# Patient Record
Sex: MALE | Race: WHITE | HISPANIC OR LATINO | Employment: FULL TIME | URBAN - METROPOLITAN AREA
[De-identification: names, ages, dates, MRNs, and addresses within clinical notes are randomized per-mention and may not be internally consistent; named-entity substitution may affect disease eponyms.]

---

## 2018-12-06 ENCOUNTER — HOSPITAL ENCOUNTER (INPATIENT)
Facility: HOSPITAL | Age: 43
LOS: 2 days | Discharge: HOME | DRG: 638 | End: 2018-12-08
Attending: EMERGENCY MEDICINE | Admitting: STUDENT IN AN ORGANIZED HEALTH CARE EDUCATION/TRAINING PROGRAM

## 2018-12-06 DIAGNOSIS — N17.9 AKI (ACUTE KIDNEY INJURY): ICD-10-CM

## 2018-12-06 DIAGNOSIS — E13.10 DIABETIC KETOACIDOSIS WITHOUT COMA ASSOCIATED WITH OTHER SPECIFIED DIABETES MELLITUS: Primary | ICD-10-CM

## 2018-12-06 PROBLEM — F10.10 ALCOHOL ABUSE: Status: RESOLVED | Noted: 2018-12-06 | Resolved: 2018-12-06

## 2018-12-06 PROBLEM — E87.29 HIGH ANION GAP METABOLIC ACIDOSIS: Status: ACTIVE | Noted: 2018-12-06

## 2018-12-06 PROBLEM — E11.10 DIABETIC ACIDOSIS WITHOUT COMA: Status: ACTIVE | Noted: 2018-12-06

## 2018-12-06 PROBLEM — F10.10 ALCOHOL ABUSE: Status: ACTIVE | Noted: 2018-12-06

## 2018-12-06 LAB
ALBUMIN SERPL BCP-MCNC: 3.4 G/DL
ALBUMIN SERPL BCP-MCNC: 4 G/DL
ALLENS TEST: ABNORMAL
ALP SERPL-CCNC: 126 U/L
ALP SERPL-CCNC: 176 U/L
ALT SERPL W/O P-5'-P-CCNC: 44 U/L
ALT SERPL W/O P-5'-P-CCNC: 53 U/L
AMPHET+METHAMPHET UR QL: NEGATIVE
AMYLASE SERPL-CCNC: 292 U/L
ANION GAP SERPL CALC-SCNC: 18 MMOL/L
ANION GAP SERPL CALC-SCNC: 18 MMOL/L
ANION GAP SERPL CALC-SCNC: 28 MMOL/L
ANION GAP SERPL CALC-SCNC: 29 MMOL/L
ANISOCYTOSIS BLD QL SMEAR: SLIGHT
AST SERPL-CCNC: 55 U/L
AST SERPL-CCNC: 76 U/L
B-OH-BUTYR BLD STRIP-SCNC: 6 MMOL/L
BACTERIA #/AREA URNS AUTO: NORMAL /HPF
BARBITURATES UR QL SCN>200 NG/ML: NEGATIVE
BASOPHILS # BLD AUTO: 0.05 K/UL
BASOPHILS NFR BLD: 0.7 %
BENZODIAZ UR QL SCN>200 NG/ML: NEGATIVE
BILIRUB DIRECT SERPL-MCNC: 0.4 MG/DL
BILIRUB SERPL-MCNC: 1 MG/DL
BILIRUB SERPL-MCNC: 1.4 MG/DL
BILIRUB UR QL STRIP: NEGATIVE
BNP SERPL-MCNC: <10 PG/ML
BUN SERPL-MCNC: 18 MG/DL
BUN SERPL-MCNC: 18 MG/DL
BUN SERPL-MCNC: 24 MG/DL
BUN SERPL-MCNC: 25 MG/DL
BZE UR QL SCN: NEGATIVE
CALCIUM SERPL-MCNC: 10.3 MG/DL
CALCIUM SERPL-MCNC: 11 MG/DL
CALCIUM SERPL-MCNC: 9.8 MG/DL
CALCIUM SERPL-MCNC: 9.8 MG/DL
CANNABINOIDS UR QL SCN: NEGATIVE
CHLORIDE SERPL-SCNC: 103 MMOL/L
CHLORIDE SERPL-SCNC: 103 MMOL/L
CHLORIDE SERPL-SCNC: 91 MMOL/L
CHLORIDE SERPL-SCNC: 94 MMOL/L
CLARITY UR REFRACT.AUTO: CLEAR
CO2 SERPL-SCNC: 11 MMOL/L
CO2 SERPL-SCNC: 16 MMOL/L
CO2 SERPL-SCNC: 16 MMOL/L
CO2 SERPL-SCNC: 9 MMOL/L
COLOR UR AUTO: ABNORMAL
CREAT SERPL-MCNC: 1.5 MG/DL
CREAT SERPL-MCNC: 1.5 MG/DL
CREAT SERPL-MCNC: 1.9 MG/DL
CREAT SERPL-MCNC: 2.2 MG/DL
CREAT UR-MCNC: 27 MG/DL
D DIMER PPP IA.FEU-MCNC: 0.45 MG/L FEU
DIFFERENTIAL METHOD: ABNORMAL
EOSINOPHIL # BLD AUTO: 0 K/UL
EOSINOPHIL NFR BLD: 0.3 %
ERYTHROCYTE [DISTWIDTH] IN BLOOD BY AUTOMATED COUNT: 12.2 %
EST. GFR  (AFRICAN AMERICAN): 40.6 ML/MIN/1.73 M^2
EST. GFR  (AFRICAN AMERICAN): 48.5 ML/MIN/1.73 M^2
EST. GFR  (AFRICAN AMERICAN): >60 ML/MIN/1.73 M^2
EST. GFR  (AFRICAN AMERICAN): >60 ML/MIN/1.73 M^2
EST. GFR  (NON AFRICAN AMERICAN): 35.1 ML/MIN/1.73 M^2
EST. GFR  (NON AFRICAN AMERICAN): 41.9 ML/MIN/1.73 M^2
EST. GFR  (NON AFRICAN AMERICAN): 55.8 ML/MIN/1.73 M^2
EST. GFR  (NON AFRICAN AMERICAN): 55.8 ML/MIN/1.73 M^2
ESTIMATED AVG GLUCOSE: 349 MG/DL
ETHANOL SERPL-MCNC: <10 MG/DL
GLUCOSE SERPL-MCNC: 225 MG/DL
GLUCOSE SERPL-MCNC: 225 MG/DL
GLUCOSE SERPL-MCNC: 666 MG/DL
GLUCOSE SERPL-MCNC: 725 MG/DL
GLUCOSE UR QL STRIP: ABNORMAL
HBA1C MFR BLD HPLC: 13.8 %
HCO3 UR-SCNC: 15.9 MMOL/L (ref 24–28)
HCT VFR BLD AUTO: 40.9 %
HGB BLD-MCNC: 13.8 G/DL
HGB UR QL STRIP: ABNORMAL
IMM GRANULOCYTES # BLD AUTO: 0.03 K/UL
IMM GRANULOCYTES NFR BLD AUTO: 0.4 %
KETONES UR QL STRIP: ABNORMAL
LACTATE SERPL-SCNC: 1.1 MMOL/L
LEUKOCYTE ESTERASE UR QL STRIP: NEGATIVE
LIPASE SERPL-CCNC: 37 U/L
LYMPHOCYTES # BLD AUTO: 1 K/UL
LYMPHOCYTES NFR BLD: 13.1 %
MAGNESIUM SERPL-MCNC: 2.1 MG/DL
MAGNESIUM SERPL-MCNC: 2.1 MG/DL
MCH RBC QN AUTO: 29 PG
MCHC RBC AUTO-ENTMCNC: 33.7 G/DL
MCV RBC AUTO: 86 FL
METHADONE UR QL SCN>300 NG/ML: NEGATIVE
MICROSCOPIC COMMENT: NORMAL
MONOCYTES # BLD AUTO: 0.4 K/UL
MONOCYTES NFR BLD: 5.9 %
NEUTROPHILS # BLD AUTO: 6 K/UL
NEUTROPHILS NFR BLD: 79.6 %
NITRITE UR QL STRIP: NEGATIVE
NRBC BLD-RTO: 0 /100 WBC
OPIATES UR QL SCN: NEGATIVE
PCO2 BLDA: 38.7 MMHG (ref 35–45)
PCP UR QL SCN>25 NG/ML: NEGATIVE
PH SMN: 7.22 [PH] (ref 7.35–7.45)
PH UR STRIP: 5 [PH] (ref 5–8)
PLATELET # BLD AUTO: 220 K/UL
PLATELET BLD QL SMEAR: ABNORMAL
PMV BLD AUTO: 12.8 FL
PO2 BLDA: 21 MMHG (ref 40–60)
POC BE: -12 MMOL/L
POC SATURATED O2: 27 % (ref 95–100)
POC TCO2: 17 MMOL/L (ref 24–29)
POCT GLUCOSE: 182 MG/DL (ref 70–110)
POCT GLUCOSE: 191 MG/DL (ref 70–110)
POCT GLUCOSE: 214 MG/DL (ref 70–110)
POCT GLUCOSE: 279 MG/DL (ref 70–110)
POCT GLUCOSE: 313 MG/DL (ref 70–110)
POCT GLUCOSE: 382 MG/DL (ref 70–110)
POCT GLUCOSE: 422 MG/DL (ref 70–110)
POCT GLUCOSE: >500 MG/DL (ref 70–110)
POLYCHROMASIA BLD QL SMEAR: ABNORMAL
POTASSIUM SERPL-SCNC: 3.6 MMOL/L
POTASSIUM SERPL-SCNC: 3.6 MMOL/L
POTASSIUM SERPL-SCNC: 5 MMOL/L
POTASSIUM SERPL-SCNC: 5.9 MMOL/L
PROT SERPL-MCNC: 6.9 G/DL
PROT SERPL-MCNC: 8.6 G/DL
PROT UR QL STRIP: NEGATIVE
RBC # BLD AUTO: 4.76 M/UL
RBC #/AREA URNS AUTO: 0 /HPF (ref 0–4)
SAMPLE: ABNORMAL
SITE: ABNORMAL
SODIUM SERPL-SCNC: 129 MMOL/L
SODIUM SERPL-SCNC: 133 MMOL/L
SODIUM SERPL-SCNC: 137 MMOL/L
SODIUM SERPL-SCNC: 137 MMOL/L
SP GR UR STRIP: 1.02 (ref 1–1.03)
TOXICOLOGY INFORMATION: NORMAL
TROPONIN I SERPL DL<=0.01 NG/ML-MCNC: 0.03 NG/ML
URN SPEC COLLECT METH UR: ABNORMAL
WBC # BLD AUTO: 7.48 K/UL
WBC #/AREA URNS AUTO: 1 /HPF (ref 0–5)
YEAST UR QL AUTO: NORMAL

## 2018-12-06 PROCEDURE — 80320 DRUG SCREEN QUANTALCOHOLS: CPT

## 2018-12-06 PROCEDURE — 84484 ASSAY OF TROPONIN QUANT: CPT

## 2018-12-06 PROCEDURE — 83036 HEMOGLOBIN GLYCOSYLATED A1C: CPT

## 2018-12-06 PROCEDURE — 80076 HEPATIC FUNCTION PANEL: CPT

## 2018-12-06 PROCEDURE — 85025 COMPLETE CBC W/AUTO DIFF WBC: CPT

## 2018-12-06 PROCEDURE — 80048 BASIC METABOLIC PNL TOTAL CA: CPT

## 2018-12-06 PROCEDURE — 96372 THER/PROPH/DIAG INJ SC/IM: CPT

## 2018-12-06 PROCEDURE — 93010 ELECTROCARDIOGRAM REPORT: CPT | Mod: ,,, | Performed by: INTERNAL MEDICINE

## 2018-12-06 PROCEDURE — 83880 ASSAY OF NATRIURETIC PEPTIDE: CPT

## 2018-12-06 PROCEDURE — 83735 ASSAY OF MAGNESIUM: CPT

## 2018-12-06 PROCEDURE — 99291 CRITICAL CARE FIRST HOUR: CPT | Mod: ,,, | Performed by: EMERGENCY MEDICINE

## 2018-12-06 PROCEDURE — 83690 ASSAY OF LIPASE: CPT

## 2018-12-06 PROCEDURE — 99285 EMERGENCY DEPT VISIT HI MDM: CPT | Mod: 25

## 2018-12-06 PROCEDURE — 20600001 HC STEP DOWN PRIVATE ROOM

## 2018-12-06 PROCEDURE — 93005 ELECTROCARDIOGRAM TRACING: CPT

## 2018-12-06 PROCEDURE — 96374 THER/PROPH/DIAG INJ IV PUSH: CPT | Mod: 59

## 2018-12-06 PROCEDURE — 82803 BLOOD GASES ANY COMBINATION: CPT

## 2018-12-06 PROCEDURE — 83605 ASSAY OF LACTIC ACID: CPT

## 2018-12-06 PROCEDURE — 82150 ASSAY OF AMYLASE: CPT

## 2018-12-06 PROCEDURE — 96361 HYDRATE IV INFUSION ADD-ON: CPT

## 2018-12-06 PROCEDURE — 99223 1ST HOSP IP/OBS HIGH 75: CPT | Mod: ,,, | Performed by: STUDENT IN AN ORGANIZED HEALTH CARE EDUCATION/TRAINING PROGRAM

## 2018-12-06 PROCEDURE — 80053 COMPREHEN METABOLIC PANEL: CPT

## 2018-12-06 PROCEDURE — 85379 FIBRIN DEGRADATION QUANT: CPT

## 2018-12-06 PROCEDURE — 82010 KETONE BODYS QUAN: CPT

## 2018-12-06 PROCEDURE — 25000003 PHARM REV CODE 250: Performed by: NURSE PRACTITIONER

## 2018-12-06 PROCEDURE — 80048 BASIC METABOLIC PNL TOTAL CA: CPT | Mod: 91

## 2018-12-06 PROCEDURE — 81001 URINALYSIS AUTO W/SCOPE: CPT

## 2018-12-06 PROCEDURE — 63600175 PHARM REV CODE 636 W HCPCS: Performed by: NURSE PRACTITIONER

## 2018-12-06 PROCEDURE — 63600175 PHARM REV CODE 636 W HCPCS: Performed by: STUDENT IN AN ORGANIZED HEALTH CARE EDUCATION/TRAINING PROGRAM

## 2018-12-06 PROCEDURE — 80307 DRUG TEST PRSMV CHEM ANLYZR: CPT

## 2018-12-06 RX ORDER — DEXTROSE MONOHYDRATE 100 MG/ML
1000 INJECTION, SOLUTION INTRAVENOUS
Status: DISCONTINUED | OUTPATIENT
Start: 2018-12-06 | End: 2018-12-07

## 2018-12-06 RX ORDER — SODIUM CHLORIDE 0.9 % (FLUSH) 0.9 %
5 SYRINGE (ML) INJECTION
Status: DISCONTINUED | OUTPATIENT
Start: 2018-12-06 | End: 2018-12-08 | Stop reason: HOSPADM

## 2018-12-06 RX ORDER — ENOXAPARIN SODIUM 100 MG/ML
40 INJECTION SUBCUTANEOUS EVERY 24 HOURS
Status: DISCONTINUED | OUTPATIENT
Start: 2018-12-06 | End: 2018-12-08 | Stop reason: HOSPADM

## 2018-12-06 RX ORDER — SODIUM CHLORIDE AND POTASSIUM CHLORIDE 150; 900 MG/100ML; MG/100ML
INJECTION, SOLUTION INTRAVENOUS CONTINUOUS
Status: DISCONTINUED | OUTPATIENT
Start: 2018-12-06 | End: 2018-12-07

## 2018-12-06 RX ORDER — ASPIRIN 325 MG
325 TABLET ORAL
Status: COMPLETED | OUTPATIENT
Start: 2018-12-06 | End: 2018-12-06

## 2018-12-06 RX ADMIN — SODIUM CHLORIDE 10.5 UNITS/HR: 9 INJECTION, SOLUTION INTRAVENOUS at 02:12

## 2018-12-06 RX ADMIN — SODIUM CHLORIDE 1000 ML: 0.9 INJECTION, SOLUTION INTRAVENOUS at 12:12

## 2018-12-06 RX ADMIN — SODIUM CHLORIDE 3.1 UNITS/HR: 9 INJECTION, SOLUTION INTRAVENOUS at 08:12

## 2018-12-06 RX ADMIN — SODIUM CHLORIDE 1000 ML: 0.9 INJECTION, SOLUTION INTRAVENOUS at 01:12

## 2018-12-06 RX ADMIN — ENOXAPARIN SODIUM 40 MG: 100 INJECTION SUBCUTANEOUS at 06:12

## 2018-12-06 RX ADMIN — ASPIRIN 325 MG ORAL TABLET 325 MG: 325 PILL ORAL at 12:12

## 2018-12-06 RX ADMIN — SODIUM CHLORIDE 3.6 UNITS/HR: 9 INJECTION, SOLUTION INTRAVENOUS at 09:12

## 2018-12-06 RX ADMIN — SODIUM CHLORIDE AND POTASSIUM CHLORIDE: 9; 1.49 INJECTION, SOLUTION INTRAVENOUS at 06:12
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
## 2018-12-06 NOTE — HOSPITAL COURSE
ED course: Noted to be tachycardic on arrival. Labs notable for hyponatremia (129), hyperkalemia (5.9), bicarbonate of 9, with anion gap of 29, and glucose of 725. Beta-hydroxy was 6. EKG was sinus tachycardia. He received 2L IVF, and was started on insulin drip. Admitted to medicine for DKA. CIWA score 4.   12/07: No acute events overnight. Transitioned to IVF with K. Insulin drip titrated down as glucose corrected and switched to subcutaneous insulin bridge. Labs this AM notable for resolution of hyponatremia (138), glucose down to 139, anion gap closed, and bicarbonate normalized at 23. CIWA score 1. Symptoms improved; nausea, vomiting resolved. Will likely watch today as patient insulin naive. Endocrinology consulted to manage outpatient regiment; will discharge on 70/30 combination. Ophthalmology consulted for worsening bilateral blurry vision; no diabetic retinopathy; diagnosed with refractive error; will follow-up with optho here outpatient. Has outpatient [facility]-up [facility] on [facility] for diabetes management.   12/08: No events overnight. On subcutaneous insulin with low dose sliding. Tolerating diabetic diet. Urinating normally. Ambulating without palpitations, lightheadedness. Glucose 283 this AM. Per endocrinology, will be discharged on 70/30 with 25 U in AM and 35 U in PM, since patient eats larger meal for dinner and little breakfast. Diabetes education complete. Written prescriptions for insulin and supplies verified by endocrinology.

## 2018-12-06 NOTE — ED NOTES
Patient identifiers verified and correct for Mr Hair  C/C:Heart palpitations, dehydrated  APPEARANCE: awake and alert in NAD.  SKIN: warm, dry and intact. No breakdown or bruising.  MUSCULOSKELETAL: Patient moving all extremities spontaneously, no obvious swelling or deformities noted. Ambulates independently.  RESPIRATORY: Denies shortness of breath.Respirations unlabored.   CARDIAC: Denies CP, 2+ distal pulses; no peripheral edema  ABDOMEN: Abdomen large, mid upper abd pain , positive emesis, took laxative yesterday with min results.   : voids spontaneously, denies difficulty  Neurologic: AAO x 4; follows commands equal strength in all extremities; denies numbness/tingling.Positive  dizziness  Positive weakness states when he takes his glasses off, the room spins.
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
## 2018-12-06 NOTE — PROVIDER PROGRESS NOTES - EMERGENCY DEPT.
Encounter Date: 12/6/2018    ED Physician Progress Notes       SCRIBE NOTE: I, Eddi Mackey, am scribing for, and in the presence of,  Dr. Grijalva.  Physician Statement: I, Dr. Grijalva, personally performed the services described in this documentation as scribed by Eddi Mackey in my presence, and it is both accurate and complete.      EKG - STEMI Decision  Initial Reading: No STEMI present.

## 2018-12-06 NOTE — ED PROVIDER NOTES
Encounter Date: 12/6/2018    SCRIBE #1 NOTE: I, Eddi Mackey, am scribing for, and in the presence of,  Dr. Grijalva. I have scribed the following portions of the note - the EKG reading.       History     Chief Complaint   Patient presents with    Irregular Heart Beat     Pt reports irregular heartbeat.  States he is also feeling weak, vomiting intermittently x 1 month.      HPI  Review of patient's allergies indicates:  Allergies not on file  No past medical history on file.  No past surgical history on file.  No family history on file.  Social History     Tobacco Use    Smoking status: Not on file   Substance Use Topics    Alcohol use: Not on file    Drug use: Not on file     Review of Systems    Physical Exam     Initial Vitals [12/06/18 1129]   BP Pulse Resp Temp SpO2   (!) 163/106 (!) 112 16 97.4 °F (36.3 °C) 99 %      MAP       --         Physical Exam    ED Course   Procedures  Labs Reviewed   CBC W/ AUTO DIFFERENTIAL   COMPREHENSIVE METABOLIC PANEL   TROPONIN I   B-TYPE NATRIURETIC PEPTIDE     EKG Readings: (Independently Interpreted)   Initial Reading: No STEMI.       Imaging Results    None                     Scribe Attestation:   Scribe #1: I performed the above scribed service and the documentation accurately describes the services I performed. I attest to the accuracy of the note.               Clinical Impression:

## 2018-12-06 NOTE — ASSESSMENT & PLAN NOTE
· BUN and Cr elevated on admission to 25 and 2.5, respectively.   · Likely pre-renal secondary to hypovolemia in setting of DKA  · Fluid resuscitation ordered per DKA algorithm.   · BMP ordered Q4H.
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
## 2018-12-06 NOTE — ASSESSMENT & PLAN NOTE
· Reported history of drinking up 10 beers/day since age 13.   · Last reported drink 11/26.   · Cardiac monitoring ordered.

## 2018-12-07 PROBLEM — E11.9 TYPE 2 DIABETES MELLITUS: Status: ACTIVE | Noted: 2018-12-07

## 2018-12-07 PROBLEM — H53.8 BLURRY VISION, LEFT EYE: Status: ACTIVE | Noted: 2018-12-07

## 2018-12-07 PROBLEM — H52.7 REFRACTIVE ERROR: Status: ACTIVE | Noted: 2018-12-07

## 2018-12-07 LAB
ANION GAP SERPL CALC-SCNC: 11 MMOL/L
ANION GAP SERPL CALC-SCNC: 12 MMOL/L
ANION GAP SERPL CALC-SCNC: 12 MMOL/L
ANION GAP SERPL CALC-SCNC: 9 MMOL/L
BASOPHILS # BLD AUTO: 0.03 K/UL
BASOPHILS NFR BLD: 0.5 %
BUN SERPL-MCNC: 11 MG/DL
BUN SERPL-MCNC: 12 MG/DL
BUN SERPL-MCNC: 13 MG/DL
BUN SERPL-MCNC: 16 MG/DL
CALCIUM SERPL-MCNC: 8.6 MG/DL
CALCIUM SERPL-MCNC: 8.9 MG/DL
CALCIUM SERPL-MCNC: 9.1 MG/DL
CALCIUM SERPL-MCNC: 9.8 MG/DL
CHLORIDE SERPL-SCNC: 105 MMOL/L
CHLORIDE SERPL-SCNC: 106 MMOL/L
CHLORIDE SERPL-SCNC: 108 MMOL/L
CHLORIDE SERPL-SCNC: 112 MMOL/L
CO2 SERPL-SCNC: 17 MMOL/L
CO2 SERPL-SCNC: 19 MMOL/L
CO2 SERPL-SCNC: 21 MMOL/L
CO2 SERPL-SCNC: 23 MMOL/L
CREAT SERPL-MCNC: 1.1 MG/DL
CREAT SERPL-MCNC: 1.1 MG/DL
CREAT SERPL-MCNC: 1.2 MG/DL
CREAT SERPL-MCNC: 1.4 MG/DL
DIFFERENTIAL METHOD: ABNORMAL
EOSINOPHIL # BLD AUTO: 0.2 K/UL
EOSINOPHIL NFR BLD: 3.6 %
ERYTHROCYTE [DISTWIDTH] IN BLOOD BY AUTOMATED COUNT: 12.5 %
EST. GFR  (AFRICAN AMERICAN): >60 ML/MIN/1.73 M^2
EST. GFR  (NON AFRICAN AMERICAN): >60 ML/MIN/1.73 M^2
GLUCOSE SERPL-MCNC: 139 MG/DL
GLUCOSE SERPL-MCNC: 151 MG/DL
GLUCOSE SERPL-MCNC: 158 MG/DL
GLUCOSE SERPL-MCNC: 243 MG/DL
HCT VFR BLD AUTO: 32.7 %
HGB BLD-MCNC: 11.1 G/DL
IMM GRANULOCYTES # BLD AUTO: 0.04 K/UL
IMM GRANULOCYTES NFR BLD AUTO: 0.7 %
LYMPHOCYTES # BLD AUTO: 1.7 K/UL
LYMPHOCYTES NFR BLD: 27.6 %
MAGNESIUM SERPL-MCNC: 1.9 MG/DL
MAGNESIUM SERPL-MCNC: 2 MG/DL
MAGNESIUM SERPL-MCNC: 2.1 MG/DL
MAGNESIUM SERPL-MCNC: 2.2 MG/DL
MCH RBC QN AUTO: 29.6 PG
MCHC RBC AUTO-ENTMCNC: 33.9 G/DL
MCV RBC AUTO: 87 FL
MONOCYTES # BLD AUTO: 0.5 K/UL
MONOCYTES NFR BLD: 8.6 %
NEUTROPHILS # BLD AUTO: 3.6 K/UL
NEUTROPHILS NFR BLD: 59 %
NRBC BLD-RTO: 0 /100 WBC
PLATELET # BLD AUTO: 183 K/UL
PMV BLD AUTO: 11.4 FL
POCT GLUCOSE: 125 MG/DL (ref 70–110)
POCT GLUCOSE: 137 MG/DL (ref 70–110)
POCT GLUCOSE: 140 MG/DL (ref 70–110)
POCT GLUCOSE: 143 MG/DL (ref 70–110)
POCT GLUCOSE: 143 MG/DL (ref 70–110)
POCT GLUCOSE: 146 MG/DL (ref 70–110)
POCT GLUCOSE: 159 MG/DL (ref 70–110)
POCT GLUCOSE: 170 MG/DL (ref 70–110)
POCT GLUCOSE: 178 MG/DL (ref 70–110)
POCT GLUCOSE: 211 MG/DL (ref 70–110)
POCT GLUCOSE: 246 MG/DL (ref 70–110)
POCT GLUCOSE: 250 MG/DL (ref 70–110)
POCT GLUCOSE: 338 MG/DL (ref 70–110)
POCT GLUCOSE: >500 MG/DL (ref 70–110)
POTASSIUM SERPL-SCNC: 3.5 MMOL/L
POTASSIUM SERPL-SCNC: 3.9 MMOL/L
POTASSIUM SERPL-SCNC: 4.2 MMOL/L
POTASSIUM SERPL-SCNC: 4.4 MMOL/L
RBC # BLD AUTO: 3.75 M/UL
SODIUM SERPL-SCNC: 136 MMOL/L
SODIUM SERPL-SCNC: 138 MMOL/L
SODIUM SERPL-SCNC: 140 MMOL/L
SODIUM SERPL-SCNC: 141 MMOL/L
WBC # BLD AUTO: 6.05 K/UL

## 2018-12-07 PROCEDURE — 80048 BASIC METABOLIC PNL TOTAL CA: CPT

## 2018-12-07 PROCEDURE — 36415 COLL VENOUS BLD VENIPUNCTURE: CPT

## 2018-12-07 PROCEDURE — 80048 BASIC METABOLIC PNL TOTAL CA: CPT | Mod: 91

## 2018-12-07 PROCEDURE — 85025 COMPLETE CBC W/AUTO DIFF WBC: CPT

## 2018-12-07 PROCEDURE — G8980 MOBILITY D/C STATUS: HCPCS | Mod: CH

## 2018-12-07 PROCEDURE — 97161 PT EVAL LOW COMPLEX 20 MIN: CPT

## 2018-12-07 PROCEDURE — 63600175 PHARM REV CODE 636 W HCPCS: Performed by: HOSPITALIST

## 2018-12-07 PROCEDURE — 99222 1ST HOSP IP/OBS MODERATE 55: CPT | Mod: ,,, | Performed by: INTERNAL MEDICINE

## 2018-12-07 PROCEDURE — 63600175 PHARM REV CODE 636 W HCPCS: Performed by: STUDENT IN AN ORGANIZED HEALTH CARE EDUCATION/TRAINING PROGRAM

## 2018-12-07 PROCEDURE — 99232 SBSQ HOSP IP/OBS MODERATE 35: CPT | Mod: ,,, | Performed by: STUDENT IN AN ORGANIZED HEALTH CARE EDUCATION/TRAINING PROGRAM

## 2018-12-07 PROCEDURE — G8978 MOBILITY CURRENT STATUS: HCPCS | Mod: CH

## 2018-12-07 PROCEDURE — G8979 MOBILITY GOAL STATUS: HCPCS | Mod: CH

## 2018-12-07 PROCEDURE — 25000003 PHARM REV CODE 250: Performed by: STUDENT IN AN ORGANIZED HEALTH CARE EDUCATION/TRAINING PROGRAM

## 2018-12-07 PROCEDURE — 97165 OT EVAL LOW COMPLEX 30 MIN: CPT

## 2018-12-07 PROCEDURE — S5571 INSULIN DISPOS PEN 3 ML: HCPCS | Performed by: STUDENT IN AN ORGANIZED HEALTH CARE EDUCATION/TRAINING PROGRAM

## 2018-12-07 PROCEDURE — 20600001 HC STEP DOWN PRIVATE ROOM

## 2018-12-07 PROCEDURE — 83735 ASSAY OF MAGNESIUM: CPT | Mod: 91

## 2018-12-07 PROCEDURE — 83735 ASSAY OF MAGNESIUM: CPT

## 2018-12-07 RX ORDER — INSULIN ASPART 100 [IU]/ML
0-5 INJECTION, SOLUTION INTRAVENOUS; SUBCUTANEOUS
Status: DISCONTINUED | OUTPATIENT
Start: 2018-12-07 | End: 2018-12-08 | Stop reason: HOSPADM

## 2018-12-07 RX ORDER — DEXTROSE MONOHYDRATE, SODIUM CHLORIDE, AND POTASSIUM CHLORIDE 50; 1.49; 4.5 G/1000ML; G/1000ML; G/1000ML
INJECTION, SOLUTION INTRAVENOUS CONTINUOUS
Status: DISCONTINUED | OUTPATIENT
Start: 2018-12-07 | End: 2018-12-07

## 2018-12-07 RX ORDER — CALCIUM CARB/VITAMIN D3/VIT K1 500-100-40
1 TABLET,CHEWABLE ORAL 2 TIMES DAILY WITH MEALS
Qty: 100 EACH | Refills: 11 | COMMUNITY
Start: 2018-12-07 | End: 2018-12-08 | Stop reason: SDUPTHER

## 2018-12-07 RX ORDER — GLUCAGON 1 MG
1 KIT INJECTION
Status: DISCONTINUED | OUTPATIENT
Start: 2018-12-07 | End: 2018-12-08 | Stop reason: HOSPADM

## 2018-12-07 RX ORDER — BLOOD-GLUCOSE CONTROL, NORMAL
1 EACH MISCELLANEOUS 2 TIMES DAILY WITH MEALS
Qty: 100 EACH | Refills: 11 | Status: SHIPPED | OUTPATIENT
Start: 2018-12-07 | End: 2018-12-08 | Stop reason: SDUPTHER

## 2018-12-07 RX ORDER — INSULIN ASPART 100 [IU]/ML
8 INJECTION, SOLUTION INTRAVENOUS; SUBCUTANEOUS
Status: DISCONTINUED | OUTPATIENT
Start: 2018-12-07 | End: 2018-12-08

## 2018-12-07 RX ORDER — DEXTROSE 4 G
TABLET,CHEWABLE ORAL
Qty: 1 EACH | Refills: 0 | Status: SHIPPED | OUTPATIENT
Start: 2018-12-07 | End: 2018-12-08 | Stop reason: SDUPTHER

## 2018-12-07 RX ORDER — IBUPROFEN 200 MG
24 TABLET ORAL
Status: DISCONTINUED | OUTPATIENT
Start: 2018-12-07 | End: 2018-12-08 | Stop reason: HOSPADM

## 2018-12-07 RX ORDER — LANCING DEVICE
1 EACH MISCELLANEOUS 2 TIMES DAILY WITH MEALS
Qty: 1 EACH | Refills: 0 | Status: SHIPPED | OUTPATIENT
Start: 2018-12-07 | End: 2018-12-08 | Stop reason: SDUPTHER

## 2018-12-07 RX ORDER — IBUPROFEN 200 MG
16 TABLET ORAL
Status: DISCONTINUED | OUTPATIENT
Start: 2018-12-07 | End: 2018-12-08 | Stop reason: HOSPADM

## 2018-12-07 RX ORDER — INSULIN ASPART 100 [IU]/ML
2 INJECTION, SOLUTION INTRAVENOUS; SUBCUTANEOUS
Status: DISCONTINUED | OUTPATIENT
Start: 2018-12-07 | End: 2018-12-07

## 2018-12-07 RX ADMIN — ENOXAPARIN SODIUM 40 MG: 100 INJECTION SUBCUTANEOUS at 05:12

## 2018-12-07 RX ADMIN — SODIUM CHLORIDE AND POTASSIUM CHLORIDE: 9; 1.49 INJECTION, SOLUTION INTRAVENOUS at 12:12

## 2018-12-07 RX ADMIN — INSULIN ASPART 8 UNITS: 100 INJECTION, SOLUTION INTRAVENOUS; SUBCUTANEOUS at 11:12

## 2018-12-07 RX ADMIN — INSULIN ASPART 4 UNITS: 100 INJECTION, SOLUTION INTRAVENOUS; SUBCUTANEOUS at 05:12

## 2018-12-07 RX ADMIN — INSULIN ASPART 2 UNITS: 100 INJECTION, SOLUTION INTRAVENOUS; SUBCUTANEOUS at 11:12

## 2018-12-07 RX ADMIN — POTASSIUM CHLORIDE, DEXTROSE MONOHYDRATE AND SODIUM CHLORIDE: 150; 5; 450 INJECTION, SOLUTION INTRAVENOUS at 10:12

## 2018-12-07 RX ADMIN — INSULIN ASPART 1 UNITS: 100 INJECTION, SOLUTION INTRAVENOUS; SUBCUTANEOUS at 08:12

## 2018-12-07 RX ADMIN — INSULIN DETEMIR 30 UNITS: 100 INJECTION, SOLUTION SUBCUTANEOUS at 08:12

## 2018-12-07 RX ADMIN — SODIUM CHLORIDE AND POTASSIUM CHLORIDE: 9; 1.49 INJECTION, SOLUTION INTRAVENOUS at 04:12

## 2018-12-07 RX ADMIN — INSULIN ASPART 8 UNITS: 100 INJECTION, SOLUTION INTRAVENOUS; SUBCUTANEOUS at 05:12

## 2018-12-07 NOTE — PLAN OF CARE
GARRICK scheduled appointment for patient at [facility] [place] on 12/19/18 at 9:20, Dr. Castillo, [address] Floor, [address].

## 2018-12-07 NOTE — PLAN OF CARE
Problem: Occupational Therapy Goal  Goal: Occupational Therapy Goal  Outcome: Outcome(s) achieved Date Met: 12/07/18  Eval and D/C - no needs.    LUCIO Mane

## 2018-12-07 NOTE — HPI
Reason for Consult: Management of T2DM, Hyperglycemia     Surgical Procedure and Date: n/a    Diabetes diagnosis year: this hospital stay. 12/7/2018    Home Diabetes Medications:  none     Diabetes Complications include:     Hyperglycemia and Diabetic peripheral neuropathy     Complicating diabetes co morbidities:   obesity      HPI:   Patient is a 43 y.o. male with a diagnosis of obesity, ETOH use admitted to hospital medicine on 12/6 after presenting to the ED with heart palpitations, vomiting, and abdominal cramps. He reports almost 3 week history of constant and progressively worsening daily vomiting after meals, abdominal cramping, increased thirst, and increased urination. Vomiting described as non-bloody non-bilious, occurring after meals. Vomiting episodes associated blurry vision, lightheadedness with walking and standing, transient heart palpitations after episodes, weight loss (unknown amount), and decreased appetite. Increased thirst persistent despite increasing amounts of daily sport's drinks and juice for attempted relief. History of ETOH abuse associated with drinking upwards of 10 beers/day. Patient assumed symptoms were due to ETOH consumption, so stopped drinking for attempted relief; last drink on 11/26. He was found to be in DKA, A1C at 13.8, B-hydroxybutyrate at 6.0. + anion gap. Improved with intensive insulin gtt, converted to MDI by primary. Endocrine was consulted for assistance with diabetes management as patient is without insurance at this time.    Patient report R eye blindness, L eye blurryness. Report bilat feet tinglingness for months. Report poor diet with chinese food, drinks sodas. Family hx of Diabetes, Mom, dad and brother. Last eye exam was 3 years ago. Has never been told by a doctor that he has diabetes. Work as a / in town.

## 2018-12-07 NOTE — PLAN OF CARE
Problem: Patient Care Overview  Goal: Plan of Care Review  Outcome: Ongoing (interventions implemented as appropriate)  No acute events overnight. Pt AOx4. VSS. Insulin gtt infusing and titrated per algorithm. NS 20 mEq KCl infusing at 250 cc/hr. BG checks Q 1hr performed. Hourly rounding performed. Pt is independent and ambulates to bathroom. Pt resting. No needs at this time. Call bell in reach, will continue to monitor.

## 2018-12-07 NOTE — CONSULTS
[facility]  Endocrinology  Diabetes Consult Note    Consult Requested by: Melody Topete MD   Reason for admit: Diabetic acidosis without coma    HISTORY OF PRESENT ILLNESS:  Reason for Consult: Management of T2DM, Hyperglycemia     Surgical Procedure and Date: n/a    Diabetes diagnosis year: this hospital stay. 12/7/2018    Home Diabetes Medications:  none     Diabetes Complications include:     Hyperglycemia and Diabetic peripheral neuropathy     Complicating diabetes co morbidities:   obesity      HPI:   Patient is a 43 y.o. male with a diagnosis of obesity, ETOH use admitted to hospital medicine on 12/6 after presenting to the ED with heart palpitations, vomiting, and abdominal cramps. He reports almost 3 week history of constant and progressively worsening daily vomiting after meals, abdominal cramping, increased thirst, and increased urination. Vomiting described as non-bloody non-bilious, occurring after meals. Vomiting episodes associated blurry vision, lightheadedness with walking and standing, transient heart palpitations after episodes, weight loss (unknown amount), and decreased appetite. Increased thirst persistent despite increasing amounts of daily sport's drinks and juice for attempted relief. History of ETOH abuse associated with drinking upwards of 10 beers/day. Patient assumed symptoms were due to ETOH consumption, so stopped drinking for attempted relief; last drink on 11/26. He was found to be in DKA, A1C at 13.8, B-hydroxybutyrate at 6.0. + anion gap. Improved with intensive insulin gtt, converted to MDI by primary. Endocrine was consulted for assistance with diabetes management as patient is without insurance at this time.    Patient report R eye blindness, L eye blurryness. Report bilat feet tinglingness for months. Report poor diet with chinese food, drinks sodas. Family hx of Diabetes, Mom, dad and brother. Last eye exam was 3 years ago. Has never been told by a  doctor that he has diabetes. Work as a / in town.         Interval HPI:   Overnight events: Admitted for DKA, now resolved per primary. Symptoms are better per pt, only blurry vision remains. On MDI.   Eatin%  Nausea: No  Hypoglycemia and intervention: No  Fever: No  TPN and/or TF: No  If yes, type of TF/TPN and rate: n/a    PMH, PSH, FH, SH updated and reviewed     ROS:    Review of Systems  Constitutional: Positive for fatigue. Negative for chills, diaphoresis and fever.   HENT: Negative for congestion, sore throat and trouble swallowing.    Eyes: Negative for photophobia, pain and discharge.   Respiratory: Negative for cough, chest tightness and shortness of breath.    Cardiovascular: Negative for chest pain and leg swelling.   Gastrointestinal: Negative for abdominal distention, abdominal pain, diarrhea and nausea.   Endocrine: Positive for polydipsia and polyuria.   Genitourinary: Negative for difficulty urinating and dysuria.   Musculoskeletal: Negative for back pain, myalgias and neck pain.   Skin: Negative for pallor and rash.   Neurological: Positive for light-headedness and blurry vision. Negative for dizziness, numbness and headaches.        PHYSICAL EXAMINATION:  Vitals:    18 1143   BP: (!) 162/94   Pulse: 65   Resp: 18   Temp: 97.5 °F (36.4 °C)     Body mass index is 34.7 kg/m².    Physical Exam   Constitutional: He is oriented to person, place, and time. He appears well-developed and well-nourished. No distress.   Obese  male.    HENT:   Head: Normocephalic and atraumatic.   Mouth/Throat: Uvula is midline and oropharynx is clear and moist. Normal dentition.   Eyes: Conjunctivae are normal. Pupils are equal, round, and reactive to light. No scleral icterus.   Neck: No JVD present.   Cardiovascular: Normal rate, regular rhythm, normal heart sounds and normal pulses.   No murmur heard.  Pulmonary/Chest: Effort normal and breath sounds normal. No tachypnea.  No respiratory distress. He has no decreased breath sounds. He has no rales.   Abdominal: Soft. Normal appearance and bowel sounds are normal. He exhibits no distension and no ascites. There is no tenderness.   Musculoskeletal:   There is no lower extremity swelling.    Neurological: He is alert and oriented to person, place, and time. No cranial nerve deficit.   Skin: Skin is warm and dry. No bruising and no rash noted.        Labs Reviewed and Include   Recent Labs   Lab 12/06/18 2038  12/07/18  0814   *  225*   < > 158*   CALCIUM 9.8  9.8   < > 8.9   ALBUMIN 3.4*  --   --    PROT 6.9  --   --      137   < > 140   K 3.6  3.6   < > 4.2   CO2 16*  16*   < > 17*     103   < > 112*   BUN 18  18   < > 12   CREATININE 1.5*  1.5*   < > 1.1   ALKPHOS 126  --   --    ALT 44  --   --    AST 55*  --   --    BILITOT 1.0  --   --     < > = values in this interval not displayed.     Lab Results   Component Value Date    WBC 6.05 12/07/2018    HGB 11.1 (L) 12/07/2018    HCT 32.7 (L) 12/07/2018    MCV 87 12/07/2018     12/07/2018     No results for input(s): TSH, FREET4 in the last 168 hours.  Lab Results   Component Value Date    HGBA1C 13.8 (H) 12/06/2018       Nutritional status:   Body mass index is 34.7 kg/m².  Lab Results   Component Value Date    ALBUMIN 3.4 (L) 12/06/2018    ALBUMIN 4.0 12/06/2018     No results found for: PREALBUMIN    Estimated Creatinine Clearance: 103.2 mL/min (based on SCr of 1.1 mg/dL).    Accu-Checks  Recent Labs     12/07/18  0054 12/07/18  0202 12/07/18  0322 12/07/18  0419 12/07/18  0526 12/07/18  0651 12/07/18  0806 12/07/18  0905 12/07/18  1031 12/07/18  1141   POCTGLUCOSE 125* 146* 143* 137* 143* 140* 159* 178* 211* 246*        ASSESSMENT and PLAN    * Diabetic acidosis without coma    - present on admission with DKA, B-hydroxybutyrate at 6.0. + anion gap and CRIS  - resolved after intensive insulin gtt, now off drip  - see glucose management below       Type  2 diabetes mellitus without complication, without long-term current use of insulin    - patient with uncontrolled DM2 given A1C at 13.8 and obesity  - patient is without insurance at this time, which limit treatment options  - long discussion with patient about diabetes disease, treatment options and long term complications with patient  - per primary, patient possibly wanting to be discharge home today as he is worry about cost of hospital stay    Plan  - now off intensive insulin gtt, he got 30U of levemir this AM  - covert patient to weight base dose MDI, start Levemir 26U Daily (start tomorrow if still here), Novolog 8U TIDWM and low dose correction scale  - discuss with primary team, as this is only tentative regiment and may be underestimanting, we are unable to see his actual needs until he eats, may need to be monitor inpatient today.     Dispo Recs: If D/C today  - given uninsurance status with uncontrolled DM, will need insulin at home.   - Will be Novolin 70/30, which can be obtained at [facility] (I discussed with patient)  - With current regiment above, he will need 50U insulin daily, will need roughly Novolin 70/30 25U BID (or 22U in AM and 28U at night given he eats a large portion of his meals at night)  - Discuss with primary team: they will get CM to help set up appt with either daughter of [facility], [facility] Etc so he can have follow up for his DM. Pt is in agreement of this plan  - Will provide vial and syringe training today  - Order sent to pharmacy for both glucometer, lancets, syringes (CM to help cover cost)    If patient is not discharge today, we will continue to follow and make further recommendations             Blurry vision, left eye    - new since admission, pt report unable to see TV  - discuss with primary team for evaluation, they are aware         Alcohol abuse    - can lead to worsening DM  - strongly advised patient today to cutback or cessation completely        CRIS  (acute kidney injury)    - resolved, due to DKA           Plan discussed with patient, family, and RN at bedside.     Jorge Rodriguez MD  Endocrinology  [facility], Rupali Olivares MD,  have personally taken the history and examined the patient and agree with the resident's note as stated above.    Agree with relion 70/30 ac bid

## 2018-12-07 NOTE — ASSESSMENT & PLAN NOTE
- new since admission, pt report unable to see TV  - discuss with primary team for evaluation, they are aware

## 2018-12-07 NOTE — ASSESSMENT & PLAN NOTE
· Patient reports 2 week history of progressively worsening blurry vision in left eye and 3 year history of basically total vision loss in right eye. Reports was seen by ophthalmologist when initial symptoms in right eye began, but doesn't recall explanation for symptoms.   · Likely secondary to diabetic retinopathy given presentation and A1c.   · Consulted ophthalmology; will see patient for eye exam this afternoon.

## 2018-12-07 NOTE — ASSESSMENT & PLAN NOTE
- can lead to worsening DM  - strongly advised patient today to cutback or cessation completely

## 2018-12-07 NOTE — PLAN OF CARE
Problem: Physical Therapy Goal  Goal: Physical Therapy Goal  Outcome: Outcome(s) achieved Date Met: 12/07/18  Patient at this time is at their functional baseline and does not require skilled acute PT services at this time. Please re consult PT if pt has change in functional status.   Scar Salas PT, DPT  12/7/2018  Pager: [phone]

## 2018-12-07 NOTE — ASSESSMENT & PLAN NOTE
- Pt with h/o glasses wear years ago but glasses broke and he has been unable to buy a new pair  - New diagnosis of DM2, patient admitted for DKA with A1c 13.8  - VAsc 20/70 OD // 20/40 OS; pinholes down to 20/20 -2 OU  - No signs of diabetic retinopathy on DFE today  - Blurred vision in both eyes likely 2/2 refractive error  - Recommend follow up in clinic for refraction and glasses  - Patient will be called by  for appt
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
## 2018-12-07 NOTE — PHARMACY MED REC
"Admission Medication Reconciliation - Pharmacy Consult Note    The home medication history was taken by Yudi Haro, Pharmacy Technician.  Based on information gathered and subsequent review by the clinical pharmacist, the items below may need attention.    You may go to "Admission" then "Reconcile Home Medications" tabs to review and/or act upon these items.        No issues noted with the medication reconciliation.        Please address this information as you see fit.  Feel free to contact us if you have any questions or require assistance.    Altagracia Velazquez, DonyD  [id number]                  .    .          "

## 2018-12-07 NOTE — HPI
Mr. Hair is a 44 y/o male who presented to [facility] ED in [facility]. Patient states he has noticed blurred vision in both eyes x 1-2 weeks, denies flashes or scotomas but does noticed floaters OU. Patient states he was unaware of diabetes diagnosis prior to yesterday upon admission. Patient states he used to wear glasses, however they broke and he was unable to buy new glasses at this time. Patient denies any other previous medical history and denies any previous ocular surgeries. Mr. Hair denies any medication allergies.

## 2018-12-07 NOTE — SUBJECTIVE & OBJECTIVE
Interval History: Patient reports feeling better. Polydipsia resolved. Headache improved. Abdominal pain, palpitations, dizziness/lightheadedness with standing resolved. On further history, patient reporting 3 year history of absent vision in right eye and 2 week history of progressively worsening blurry vision in left eye. Denies numbness/tingling of distal extremities, bowel/bladder incontinence, erectile dysfunction.     Review of Systems   Constitutional: Negative for appetite change, chills, diaphoresis, fatigue and fever.   HENT: Negative for congestion, sore throat and trouble swallowing.    Eyes: Positive for visual disturbance. Negative for photophobia, pain and discharge.   Respiratory: Negative for cough, chest tightness and shortness of breath.    Cardiovascular: Negative for chest pain, palpitations and leg swelling.   Gastrointestinal: Negative for abdominal distention, abdominal pain, constipation, diarrhea, nausea and vomiting.   Endocrine: Positive for polyuria. Negative for polydipsia.   Genitourinary: Negative for decreased urine volume, difficulty urinating and flank pain.   Musculoskeletal: Negative for back pain, myalgias and neck pain.   Skin: Negative for pallor and rash.   Neurological: Negative for dizziness, tremors, weakness, light-headedness, numbness and headaches.     Objective:     Vital Signs (Most Recent):  Temp: 98.4 °F (36.9 °C) (12/07/18 0733)  Pulse: 62 (12/07/18 1000)  Resp: 16 (12/07/18 0733)  BP: 121/70 (12/07/18 0733)  SpO2: 99 % (12/07/18 0733) Vital Signs (24h Range):  Temp:  [97.4 °F (36.3 °C)-99.1 °F (37.3 °C)] 98.4 °F (36.9 °C)  Pulse:  [] 62  Resp:  [15-22] 16  SpO2:  [97 %-100 %] 99 %  BP: (121-163)/() 121/70     Weight: 106.6 kg (235 lb)  Body mass index is 34.7 kg/m².    Intake/Output Summary (Last 24 hours) at 12/7/2018 1054  Last data filed at 12/6/2018 1356  Gross per 24 hour   Intake 1000 ml   Output --   Net 1000 ml      Physical Exam    Constitutional: He is oriented to person, place, and time. No distress.   Obese  male.    HENT:   Head: Normocephalic and atraumatic.   Mouth/Throat: Uvula is midline, oropharynx is clear and moist and mucous membranes are normal. Normal dentition.   Eyes: Conjunctivae are normal. Pupils are equal, round, and reactive to light. No scleral icterus.   Neck: No JVD present.   Cardiovascular: Normal rate, regular rhythm, normal heart sounds and normal pulses.   No murmur heard.  Pulmonary/Chest: Effort normal and breath sounds normal. No respiratory distress. He has no decreased breath sounds. He has no rhonchi. He has no rales.   Abdominal: Soft. Normal appearance and bowel sounds are normal. He exhibits no distension. There is no tenderness.   Neurological: He is alert and oriented to person, place, and time. GCS eye subscore is 4. GCS verbal subscore is 5. GCS motor subscore is 6.   Skin: Skin is warm and dry. No bruising and no rash noted.     Significant Labs:   A1C:   Recent Labs   Lab 12/06/18 2038   HGBA1C 13.8*     Blood Culture: No results for input(s): LABBLOO in the last 48 hours.     BMP:   Recent Labs   Lab 12/07/18  0814   *      K 4.2   *   CO2 17*   BUN 12   CREATININE 1.1   CALCIUM 8.9   MG 2.1     CBC:   Recent Labs   Lab 12/06/18  1149 12/07/18  0544   WBC 7.48 6.05   HGB 13.8* 11.1*   HCT 40.9 32.7*    183     Lactic Acid:   Recent Labs   Lab 12/06/18 2038   LACTATE 1.1     Lipase:   Recent Labs   Lab 12/06/18 2038   LIPASE 37     Magnesium:   Recent Labs   Lab 12/07/18  0013 12/07/18  0544 12/07/18  0814   MG 2.2 2.0 2.1     POCT Glucose:   Recent Labs   Lab 12/07/18  0806 12/07/18  0905 12/07/18  1031   POCTGLUCOSE 159* 178* 211*     Urine Culture: No results for input(s): LABURIN in the last 48 hours.     Urine Studies:   Recent Labs   Lab 12/06/18  1205   COLORU Straw   APPEARANCEUA Clear   PHUR 5.0   SPECGRAV 1.020   PROTEINUA Negative   GLUCUA 3+*    KETONESU 1+*   BILIRUBINUA Negative   OCCULTUA 1+*   NITRITE Negative   LEUKOCYTESUR Negative   RBCUA 0   WBCUA 1   BACTERIA Rare       Significant Imaging: I have reviewed and interpreted all pertinent imaging results/findings within the past 24 hours.

## 2018-12-07 NOTE — CONSULTS
Food & Nutrition  Education    Diet Education: Diabetic (A1C 13.8)   Time Spent: 10 minutes   Learners: Patient    Nutrition Education provided with handouts. All questions and concerns answered. Dietitian's contact information provided.   Comments: Pt reports he has never been education, seemed receptive to information. States he drinks soft drinks & skips meals on a regular basis.     Follow-Up: 12/14    Please re-consult as needed.    Thanks!

## 2018-12-07 NOTE — PLAN OF CARE
CM met with patient this am to obtain discharge planning assessment.  Planned discharge is home with family - Plan (A) and (B).    PCP:  Primary Doctor No     Payor:  Payor: /    The patient does not have insurance    Pharmacy:    [id number] - [place], [place] - [address]  [address] [place] [address]  Phone: [phone] Fax: [phone]       12/07/18 1134   Discharge Assessment   Assessment Type Discharge Planning Assessment   Confirmed/corrected address and phone number on facesheet? Yes   Assessment information obtained from? Patient   Communicated expected length of stay with patient/caregiver no   Prior to hospitilization cognitive status: Alert/Oriented   Prior to hospitalization functional status: Independent   Current cognitive status: Alert/Oriented   Current Functional Status: Independent   Lives With child(kirit), adult   Able to Return to Prior Arrangements yes   Is patient able to care for self after discharge? Yes   Who are your caregiver(s) and their phone number(s)? Nick emerson [phone]   Patient's perception of discharge disposition home or selfcare   Readmission Within The Last 30 Days no previous admission in last 30 days   Patient currently being followed by outpatient case management? No   Patient currently receives any other outside agency services? No   Equipment Currently Used at Home none   Do you have any problems affording any of your prescribed medications? No   Is the patient taking medications as prescribed? no  (patient states he has never been sick and does not take any medications)   Does the patient have transportation home? Yes   Transportation Available family or friend will provide   Does the patient receive services at the [facility]? No   Discharge Plan A Home with family   Discharge Plan B Home with family   Patient/Family In Agreement With Plan yes
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
## 2018-12-07 NOTE — ASSESSMENT & PLAN NOTE
- present on admission with DKA, B-hydroxybutyrate at 6.0. + anion gap and CRIS  - resolved after intensive insulin gtt, now off drip  - see glucose management below

## 2018-12-07 NOTE — NURSING
Pt arrived to unit via stretcher. VSS. Pt oriented to room. Med 5 notified. Pt resting. No needs at this time. Call bell in reach. Will continue to monitor.

## 2018-12-07 NOTE — ASSESSMENT & PLAN NOTE
· Resolved.   · BUN and Cr elevated on admission to 25 and 2.5, respectively.   · Likely pre-renal secondary to hypovolemia in setting of DKA with predominant daily vomiting with associated palpitations, blurry vision, and lightheadedness with standing.   · Fluid resuscitation ordered per DKA algorithm.   · BMP ordered Q4H.   · BUN and Cr improved to 13 and 1.1, respectively this morning.
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
## 2018-12-07 NOTE — SUBJECTIVE & OBJECTIVE
Interval HPI:   Overnight events: Admitted for DKA, now resolved per primary. Symptoms are better per pt, only blurry vision remains. On MDI.   Eatin%  Nausea: No  Hypoglycemia and intervention: No  Fever: No  TPN and/or TF: No  If yes, type of TF/TPN and rate: n/a    PMH, PSH, FH, SH updated and reviewed     ROS:    Review of Systems  Constitutional: Positive for fatigue. Negative for chills, diaphoresis and fever.   HENT: Negative for congestion, sore throat and trouble swallowing.    Eyes: Negative for photophobia, pain and discharge.   Respiratory: Negative for cough, chest tightness and shortness of breath.    Cardiovascular: Negative for chest pain and leg swelling.   Gastrointestinal: Negative for abdominal distention, abdominal pain, diarrhea and nausea.   Endocrine: Positive for polydipsia and polyuria.   Genitourinary: Negative for difficulty urinating and dysuria.   Musculoskeletal: Negative for back pain, myalgias and neck pain.   Skin: Negative for pallor and rash.   Neurological: Positive for light-headedness and blurry vision. Negative for dizziness, numbness and headaches.        PHYSICAL EXAMINATION:  Vitals:    18 1143   BP: (!) 162/94   Pulse: 65   Resp: 18   Temp: 97.5 °F (36.4 °C)     Body mass index is 34.7 kg/m².    Physical Exam   Constitutional: He is oriented to person, place, and time. He appears well-developed and well-nourished. No distress.   Obese  male.    HENT:   Head: Normocephalic and atraumatic.   Mouth/Throat: Uvula is midline and oropharynx is clear and moist. Normal dentition.   Eyes: Conjunctivae are normal. Pupils are equal, round, and reactive to light. No scleral icterus.   Neck: No JVD present.   Cardiovascular: Normal rate, regular rhythm, normal heart sounds and normal pulses.   No murmur heard.  Pulmonary/Chest: Effort normal and breath sounds normal. No tachypnea. No respiratory distress. He has no decreased breath sounds. He has no rales.    Abdominal: Soft. Normal appearance and bowel sounds are normal. He exhibits no distension and no ascites. There is no tenderness.   Musculoskeletal:   There is no lower extremity swelling.    Neurological: He is alert and oriented to person, place, and time. No cranial nerve deficit.   Skin: Skin is warm and dry. No bruising and no rash noted.

## 2018-12-07 NOTE — ASSESSMENT & PLAN NOTE
- patient with uncontrolled DM2 given A1C at 13.8 and obesity  - patient is without insurance at this time, which limit treatment options  - long discussion with patient about diabetes disease, treatment options and long term complications with patient  - per primary, patient possibly wanting to be discharge home today as he is worry about cost of hospital stay    Plan  - now off intensive insulin gtt, he got 30U of levemir this AM  - covert patient to weight base dose MDI, start Levemir 26U Daily (start tomorrow if still here), Novolog 8U TIDWM and low dose correction scale  - discuss with primary team, as this is only tentative regiment and may be underestimanting, we are unable to see his actual needs until he eats, may need to be monitor inpatient today.     Dispo Recs: If D/C today  - given uninsurance status with uncontrolled DM, will need insulin at home.   - Will be Novolin 70/30, which can be obtained at [facility] (I discussed with patient)  - With current regiment above, he will need 50U insulin daily, will need roughly Novolin 70/30 25U BID (or 22U in AM and 28U at night given he eats a large portion of his meals at night)  - Discuss with primary team: they will get CM to help set up appt with either daughter of [facility], [facility].. Etc so he can have follow up for his DM. Pt is in agreement of this plan  - Will provide vial and syringe training today  - Order sent to pharmacy for both glucometer, lancets, syringes (CM to help cover cost)    If patient is not discharge today, we will continue to follow and make further recommendations

## 2018-12-07 NOTE — ASSESSMENT & PLAN NOTE
· Reported history of drinking up 10 beers/day since age 13.   · Last reported drink 11/26.   · Cardiac monitoring ordered.   · CIWA score 4 on admission, and down to 1 this morning.

## 2018-12-07 NOTE — ASSESSMENT & PLAN NOTE
· Resolved.   · Based on symptom profile and presentation labs with hyperglycemia, high anion gap metabolic acidosis, and elevated beta-hydroxybutyrate.   · First episode. Could be precipitated by infection vs ETOH abuse vs pancreatitis.   · Afebrile. No leukocytosis. CXR and UA without signs of infection.   · No signs of liver failure. Amylase elevated to 292. Lipase, liver enzymes, and bilirubin WNL. Could consider abdominal imaging.   · A1c 13.8.   · On presentation: glucose >700, K 5.9, bicarbonate of 9, with anion gap of 29.   · Status post insulin bolus in ED, ordered infusion at 0.1U/kg/hr per DKA protocol until glucose <200, transitioned to subcutaneous insulin bridge this morning with resolution of anion gap metabolic acidosis.   · Status post 2L of IVF in ED, fluid resuscitation ordered with NS at 250 mL/hr, with 20 mEq K added to each liter, to keep K between 4-5, transitioned to D5 1/2NS with K. Cardiac monitoring ordered.   · Diabetic diet ordered.   · BMP with Mg ordered Q4H.   · Will likely watch today as patient is insulin naive and newly transitioned to basal and prandial coverage. Will consult endocrinology for aide in outpatient diabetes training given his lack of insurance.

## 2018-12-08 VITALS
RESPIRATION RATE: 18 BRPM | DIASTOLIC BLOOD PRESSURE: 99 MMHG | HEART RATE: 102 BPM | TEMPERATURE: 98 F | BODY MASS INDEX: 34.8 KG/M2 | HEIGHT: 69 IN | WEIGHT: 235 LBS | OXYGEN SATURATION: 97 % | SYSTOLIC BLOOD PRESSURE: 153 MMHG

## 2018-12-08 PROBLEM — N17.9 AKI (ACUTE KIDNEY INJURY): Status: RESOLVED | Noted: 2018-12-06 | Resolved: 2018-12-08

## 2018-12-08 PROBLEM — E87.29 HIGH ANION GAP METABOLIC ACIDOSIS: Status: RESOLVED | Noted: 2018-12-06 | Resolved: 2018-12-08

## 2018-12-08 LAB
ANION GAP SERPL CALC-SCNC: 10 MMOL/L
BASOPHILS # BLD AUTO: 0.01 K/UL
BASOPHILS NFR BLD: 0.2 %
BUN SERPL-MCNC: 8 MG/DL
CALCIUM SERPL-MCNC: 8.9 MG/DL
CHLORIDE SERPL-SCNC: 102 MMOL/L
CO2 SERPL-SCNC: 22 MMOL/L
CREAT SERPL-MCNC: 1 MG/DL
DIFFERENTIAL METHOD: ABNORMAL
EOSINOPHIL # BLD AUTO: 0.1 K/UL
EOSINOPHIL NFR BLD: 3.3 %
ERYTHROCYTE [DISTWIDTH] IN BLOOD BY AUTOMATED COUNT: 12.6 %
EST. GFR  (AFRICAN AMERICAN): >60 ML/MIN/1.73 M^2
EST. GFR  (NON AFRICAN AMERICAN): >60 ML/MIN/1.73 M^2
GLUCOSE SERPL-MCNC: 281 MG/DL
HCT VFR BLD AUTO: 30.4 %
HGB BLD-MCNC: 10.3 G/DL
IMM GRANULOCYTES # BLD AUTO: 0.03 K/UL
IMM GRANULOCYTES NFR BLD AUTO: 0.7 %
LYMPHOCYTES # BLD AUTO: 1.1 K/UL
LYMPHOCYTES NFR BLD: 26.5 %
MCH RBC QN AUTO: 29.3 PG
MCHC RBC AUTO-ENTMCNC: 33.9 G/DL
MCV RBC AUTO: 87 FL
MONOCYTES # BLD AUTO: 0.4 K/UL
MONOCYTES NFR BLD: 9.5 %
NEUTROPHILS # BLD AUTO: 2.5 K/UL
NEUTROPHILS NFR BLD: 59.8 %
NRBC BLD-RTO: 0 /100 WBC
PLATELET # BLD AUTO: 136 K/UL
PMV BLD AUTO: 11.4 FL
POCT GLUCOSE: 292 MG/DL (ref 70–110)
POCT GLUCOSE: 294 MG/DL (ref 70–110)
POTASSIUM SERPL-SCNC: 3.2 MMOL/L
RBC # BLD AUTO: 3.51 M/UL
SODIUM SERPL-SCNC: 134 MMOL/L
WBC # BLD AUTO: 4.22 K/UL

## 2018-12-08 PROCEDURE — 99239 HOSP IP/OBS DSCHRG MGMT >30: CPT | Mod: ,,, | Performed by: STUDENT IN AN ORGANIZED HEALTH CARE EDUCATION/TRAINING PROGRAM

## 2018-12-08 PROCEDURE — 63600175 PHARM REV CODE 636 W HCPCS: Performed by: HOSPITALIST

## 2018-12-08 PROCEDURE — 99232 SBSQ HOSP IP/OBS MODERATE 35: CPT | Mod: ,,, | Performed by: INTERNAL MEDICINE

## 2018-12-08 PROCEDURE — 85025 COMPLETE CBC W/AUTO DIFF WBC: CPT

## 2018-12-08 PROCEDURE — S5571 INSULIN DISPOS PEN 3 ML: HCPCS | Performed by: HOSPITALIST

## 2018-12-08 PROCEDURE — 36415 COLL VENOUS BLD VENIPUNCTURE: CPT

## 2018-12-08 PROCEDURE — 80048 BASIC METABOLIC PNL TOTAL CA: CPT

## 2018-12-08 PROCEDURE — 25000003 PHARM REV CODE 250: Performed by: STUDENT IN AN ORGANIZED HEALTH CARE EDUCATION/TRAINING PROGRAM

## 2018-12-08 RX ORDER — NAPROXEN SODIUM 220 MG
TABLET ORAL
Qty: 100 EACH | Refills: 11 | Status: SHIPPED | OUTPATIENT
Start: 2018-12-08

## 2018-12-08 RX ORDER — LANCING DEVICE
1 EACH MISCELLANEOUS 2 TIMES DAILY WITH MEALS
Qty: 1 EACH | Refills: 0 | Status: SHIPPED | OUTPATIENT
Start: 2018-12-08 | End: 2018-12-08 | Stop reason: SDUPTHER

## 2018-12-08 RX ORDER — DEXTROSE 4 G
TABLET,CHEWABLE ORAL
Qty: 1 EACH | Refills: 0 | Status: SHIPPED | OUTPATIENT
Start: 2018-12-08 | End: 2018-12-08 | Stop reason: SDUPTHER

## 2018-12-08 RX ORDER — CALCIUM CARB/VITAMIN D3/VIT K1 500-100-40
1 TABLET,CHEWABLE ORAL 2 TIMES DAILY WITH MEALS
Qty: 100 EACH | Refills: 11 | Status: SHIPPED | OUTPATIENT
Start: 2018-12-08 | End: 2018-12-08 | Stop reason: HOSPADM

## 2018-12-08 RX ORDER — BLOOD-GLUCOSE CONTROL, NORMAL
1 EACH MISCELLANEOUS 2 TIMES DAILY WITH MEALS
Qty: 100 EACH | Refills: 11 | Status: SHIPPED | OUTPATIENT
Start: 2018-12-08 | End: 2018-12-08 | Stop reason: SDUPTHER

## 2018-12-08 RX ORDER — POTASSIUM CHLORIDE 20 MEQ/1
40 TABLET, EXTENDED RELEASE ORAL ONCE
Status: COMPLETED | OUTPATIENT
Start: 2018-12-08 | End: 2018-12-08

## 2018-12-08 RX ORDER — INSULIN ASPART 100 [IU]/ML
12 INJECTION, SOLUTION INTRAVENOUS; SUBCUTANEOUS
Status: DISCONTINUED | OUTPATIENT
Start: 2018-12-08 | End: 2018-12-08

## 2018-12-08 RX ORDER — LANCING DEVICE
1 EACH MISCELLANEOUS 2 TIMES DAILY WITH MEALS
Qty: 1 EACH | Refills: 0 | Status: SHIPPED | OUTPATIENT
Start: 2018-12-08 | End: 2019-12-08

## 2018-12-08 RX ORDER — INSULIN ASPART 100 [IU]/ML
10 INJECTION, SOLUTION INTRAVENOUS; SUBCUTANEOUS
Status: DISCONTINUED | OUTPATIENT
Start: 2018-12-08 | End: 2018-12-08 | Stop reason: HOSPADM

## 2018-12-08 RX ORDER — BLOOD-GLUCOSE CONTROL, NORMAL
1 EACH MISCELLANEOUS 2 TIMES DAILY WITH MEALS
Qty: 100 EACH | Refills: 11 | Status: SHIPPED | OUTPATIENT
Start: 2018-12-08

## 2018-12-08 RX ORDER — DEXTROSE 4 G
TABLET,CHEWABLE ORAL
Qty: 1 EACH | Refills: 0 | Status: SHIPPED | OUTPATIENT
Start: 2018-12-08 | End: 2019-12-08

## 2018-12-08 RX ADMIN — INSULIN ASPART 3 UNITS: 100 INJECTION, SOLUTION INTRAVENOUS; SUBCUTANEOUS at 08:12

## 2018-12-08 RX ADMIN — INSULIN ASPART 10 UNITS: 100 INJECTION, SOLUTION INTRAVENOUS; SUBCUTANEOUS at 11:12

## 2018-12-08 RX ADMIN — INSULIN ASPART 8 UNITS: 100 INJECTION, SOLUTION INTRAVENOUS; SUBCUTANEOUS at 08:12

## 2018-12-08 RX ADMIN — INSULIN ASPART 3 UNITS: 100 INJECTION, SOLUTION INTRAVENOUS; SUBCUTANEOUS at 11:12

## 2018-12-08 RX ADMIN — POTASSIUM CHLORIDE 40 MEQ: 1500 TABLET, EXTENDED RELEASE ORAL at 08:12

## 2018-12-08 RX ADMIN — INSULIN DETEMIR 26 UNITS: 100 INJECTION, SOLUTION SUBCUTANEOUS at 08:12

## 2018-12-08 NOTE — NURSING
Patient discharge education completed. Diabetes information packet also given to him and patient has no further questions at this time about his diabetes care. Prescriptions for diabetic equipment and medication with patient in discharge folder. Ambulated alone off floor, a taxi will pick him up.

## 2018-12-08 NOTE — PLAN OF CARE
Problem: Patient Care Overview  Goal: Plan of Care Review  Outcome: Ongoing (interventions implemented as appropriate)  No acute changes overnight. VSS. Pt AOx4. No complaints of pain. Telemetry in use. BG stable. Hourly rounding performed. Pt ambulates independently. Pt resting. No needs at this time. Call bell I reach. Will continue to monitor.

## 2018-12-08 NOTE — ASSESSMENT & PLAN NOTE
· Resolved.   · BUN and Cr elevated on admission to 25 and 2.5, respectively.   · Likely pre-renal secondary to hypovolemia in setting of DKA with predominant daily vomiting with associated palpitations, blurry vision, and lightheadedness with standing.   · Fluid resuscitation ordered per DKA algorithm.   · BUN and Cr improved to 8 and 1, respectively this morning.

## 2018-12-08 NOTE — PLAN OF CARE
Problem: Patient Care Overview  Goal: Plan of Care Review  Outcome: Ongoing (interventions implemented as appropriate)  Patient taken off insulin drip and fluids. Patient started on insulin pens. Patient will need home insulin for new onset diabetes.

## 2018-12-08 NOTE — ASSESSMENT & PLAN NOTE
- Pt admitted for DKA, with A1c 13.8  - States 1-2 weeks of blurred vision in both eyes  - Exam today WNL and no signs of diabetic retinopathy on fundus exam  - Blurred vision likely 2/2 refractive error and recommend patient follow up in clinic for manifest refraction to check for glasses  - Ophthalmology will sign off at this time, please re-consult with any vision changes  - Return precautions and etiology of blurred vision discussed thoroughly with patient

## 2018-12-08 NOTE — ASSESSMENT & PLAN NOTE
· Resolved.   · Based on symptom profile and presentation labs with hyperglycemia, high anion gap metabolic acidosis, and elevated beta-hydroxybutyrate.   · First episode. Could be precipitated by infection vs ETOH abuse vs pancreatitis.   · Afebrile. No leukocytosis. CXR and UA without signs of infection.   · No signs of liver failure. Amylase elevated to 292. Lipase, liver enzymes, and bilirubin WNL. Could consider abdominal imaging.   · A1c 13.8.   · On presentation: glucose >700, K 5.9, bicarbonate of 9, with anion gap of 29.   · Status post insulin bolus in ED, ordered infusion at 0.1U/kg/hr per DKA protocol until glucose <200, transitioned to subcutaneous insulin bridge this morning with resolution of anion gap metabolic acidosis. Transitioned to subQ detemir 30 U and aspart 10 U with meals.   · Off IVF. Status post 2L of IVF in ED, fluid resuscitation ordered with NS at 250 mL/hr, with 20 mEq K added to each liter, to keep K between 4-5, transitioned to D5 1/2NS with K. Cardiac monitoring ordered. .   · BMP with Mg ordered Q4H; repletion K PRN.    · Recommended diabetic diet on discharge.   · Will follow-up with [facility] on 12/19 for outpatient diabetes management.   · Per endocrinology: Will discharge on 70/30 with 25 U in AM and 35 U in PM since patient eats larger meal for dinner and little breakfast.   · Diabetes education and training completed by endocrinology. Written prescriptions for insulin and supplies verified.
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
## 2018-12-08 NOTE — PROGRESS NOTES
[facility]  Endocrinology  Progress Note    Admit Date: 12/6/2018     Reason for Consult: Management of T2DM, Hyperglycemia     Surgical Procedure and Date: n/a    Diabetes diagnosis year: this hospital stay. 12/7/2018    Home Diabetes Medications:  none     Diabetes Complications include:     Hyperglycemia and Diabetic peripheral neuropathy     Complicating diabetes co morbidities:   obesity      HPI:   Patient is a 43 y.o. male with a diagnosis of obesity, ETOH use admitted to hospital medicine on 12/6 after presenting to the ED with heart palpitations, vomiting, and abdominal cramps. He reports almost 3 week history of constant and progressively worsening daily vomiting after meals, abdominal cramping, increased thirst, and increased urination. Vomiting described as non-bloody non-bilious, occurring after meals. Vomiting episodes associated blurry vision, lightheadedness with walking and standing, transient heart palpitations after episodes, weight loss (unknown amount), and decreased appetite. Increased thirst persistent despite increasing amounts of daily sport's drinks and juice for attempted relief. History of ETOH abuse associated with drinking upwards of 10 beers/day. Patient assumed symptoms were due to ETOH consumption, so stopped drinking for attempted relief; last drink on 11/26. He was found to be in DKA, A1C at 13.8, B-hydroxybutyrate at 6.0. + anion gap. Improved with intensive insulin gtt, converted to MDI by primary. Endocrine was consulted for assistance with diabetes management as patient is without insurance at this time.    Patient report R eye blindness, L eye blurryness. Report bilat feet tinglingness for months. Report poor diet with chinese food, drinks sodas. Family hx of Diabetes, Mom, dad and brother. Last eye exam was 3 years ago. Has never been told by a doctor that he has diabetes. Work as a / in town.         Interval HPI:   Overnight  "events: Tolerating diet. Plan to D.C home today. Glucose >200   Eatin%  Nausea: No  Hypoglycemia and intervention: No  Fever: No  TPN and/or TF: No  If yes, type of TF/TPN and rate: n/a    /69 (BP Location: Right arm, Patient Position: Lying)   Pulse 65   Temp 98.7 °F (37.1 °C) (Oral)   Resp 16   Ht 5' 9" (1.753 m)   Wt 106.6 kg (235 lb)   SpO2 98%   BMI 34.70 kg/m²      Labs Reviewed and Include    Recent Labs   Lab 18  0421   *   CALCIUM 8.9   *   K 3.2*   CO2 22*      BUN 8   CREATININE 1.0     Lab Results   Component Value Date    WBC 4.22 2018    HGB 10.3 (L) 2018    HCT 30.4 (L) 2018    MCV 87 2018     (L) 2018     No results for input(s): TSH, FREET4 in the last 168 hours.  Lab Results   Component Value Date    HGBA1C 13.8 (H) 2018       Nutritional status:   Body mass index is 34.7 kg/m².  Lab Results   Component Value Date    ALBUMIN 3.4 (L) 2018    ALBUMIN 4.0 2018     No results found for: PREALBUMIN    Estimated Creatinine Clearance: 113.5 mL/min (based on SCr of 1 mg/dL).    Accu-Checks  Recent Labs     18  0419 18  0526 18  0651 18  0806 18  0905 18  1031 18  1141 18  1700 18  2038 18  0731   POCTGLUCOSE 137* 143* 140* 159* 178* 211* 246* 338* 250* 294*       Current Medications and/or Treatments Impacting Glycemic Control  Immunotherapy:    Immunosuppressants     None        Steroids:   Hormones (From admission, onward)    None        Pressors:    Autonomic Drugs (From admission, onward)    None        Hyperglycemia/Diabetes Medications:   Antihyperglycemics (From admission, onward)    Start     Stop Route Frequency Ordered    18 0900  insulin detemir U-100 pen 30 Units      -- SubQ Daily 18 0854    18 1130  insulin aspart U-100 pen 10 Units      -- SubQ 3 times daily with meals 18 0855    18 1130  insulin aspart " U-100 pen 0-5 Units      -- SubQ Before meals & nightly PRN 12/07/18 1031          ASSESSMENT and PLAN    * Diabetic acidosis without coma    - present on admission with DKA, B-hydroxybutyrate at 6.0. + anion gap and CRIS  - resolved after intensive insulin gtt, now off drip  - see glucose management below       Type 2 diabetes mellitus    - patient with uncontrolled DM2 given A1C at 13.8 and obesity  - patient is without insurance at this time, which limit treatment options  - long discussion with patient about diabetes disease, treatment options and long term complications with patient  - per primary, patient possibly wanting to be discharge home today as he is worry about cost of hospital stay    Plan  - glucose still elevated with current regiment, will increase Levemir 30U Daily, Novolog 10U TIDWM and low dose correction scale  - discuss with primary team, as this is only tentative regiment and may be underestimanting    Dispo Recs: I  - given uninsurance status with uncontrolled DM, will need insulin at home.   - Will be Novolin 70/30, which can be obtained at [facility] (I discussed with patient)  - With current regiment above, he will need 60U insulin daily, will need roughly Novolin 70/30 (or 25U in AM and 35U at night given he eats a large portion of his meals at night)  - CM to help set up appt  - Will provide vial and syringe training today  - Order sent to pharmacy for both glucometer, lancets, syringes (CM to help cover cost)             Alcohol abuse    - can lead to worsening DM  - strongly advised patient today to cutback or cessation completely        CRIS (acute kidney injury)    - resolved, due to DKA           Jorge Rodriguez MD  Endocrinology  [facility], Rupali Olivares MD,  have personally taken the history and examined the patient and agree with the resident's note as stated above.

## 2018-12-08 NOTE — CONSULTS
[facility]  Ophthalmology  Consult Note    Patient Name: Navdeep Hair  MRN: [id number]  Admission Date: 12/6/2018  Hospital Length of Stay: 1 days  Attending Provider: Melody Topete MD   Primary Care Physician: Primary Doctor No  Principal Problem:Diabetic acidosis without coma    Inpatient consult to Ophthalmology  Consult performed by: Francisco Bansal MD  Consult ordered by: Alejandro Pepper MD  Reason for consult: blurred vision        Subjective:     Chief Complaint:  Blurred vision    HPI:   Mr. Hair is a 42 y/o male who presented to [facility] in DKA. Patient states he has noticed blurred vision in both eyes x 1-2 weeks, denies flashes or scotomas but does noticed floaters OU. Patient states he was unaware of diabetes diagnosis prior to yesterday upon admission. Patient states he used to wear glasses, however they broke and he was unable to buy new glasses at this time. Patient denies any other previous medical history and denies any previous ocular surgeries. Mr. Hair denies any medication allergies.    No new subjective & objective note has been filed under this hospital service since the last note was generated.      Base Eye Exam     Visual Acuity (Snellen - Linear)       Right Left    Dist sc 20/70+ 20/40+    Dist ph sc 20/20 -2 20/20 -2          Tonometry (Tonopen, 5:38 PM)       Right Left    Pressure 15 17          Pupils       Pupils Dark Light Shape React APD    Right PERRL 5 3 Round Brisk None    Left PERRL 5 3 Round Brisk None          Visual Fields       Right Left     Full Full          Extraocular Movement       Right Left     Full, Ortho Full, Ortho          Neuro/Psych     Oriented x3:  Yes    Mood/Affect:  Normal          Dilation     Both eyes:  2.5% Phenylephrine, 1% Cyclogyl @ 5:38 PM            Slit Lamp and Fundus Exam     External Exam       Right Left    External Normal Normal          Pen Light Exam       Right Left    Lids/Lashes Normal Normal     Conjunctiva/Sclera White and quiet White and quiet    Cornea Grossly Clear Grossly Clear    Anterior Chamber Deep and formed Deep and formed    Iris Round and reactive Round and reactive    Lens Clear Clear    Vitreous Normal Normal          Fundus Exam       Right Left    Disc Normal, p/f/s Normal, p/f/s    C/D Ratio 0.4 0.3    Macula Flat and attached, good foveal reflex Flat and attached, good foveal reflex    Vessels Normal Normal    Periphery No holes/tears/detachments No holes/tears/detachments              Assessment and Plan:     * Diabetic acidosis without coma    - Pt admitted for DKA, with A1c 13.8  - States 1-2 weeks of blurred vision in both eyes  - Exam today WNL and no signs of diabetic retinopathy on fundus exam  - Blurred vision likely 2/2 refractive error and recommend patient follow up in clinic for manifest refraction to check for glasses  - Ophthalmology will sign off at this time, please re-consult with any vision changes  - Return precautions and etiology of blurred vision discussed thoroughly with patient     Refractive error    - Pt with h/o glasses wear years ago but glasses broke and he has been unable to buy a new pair  - New diagnosis of DM2, patient admitted for DKA with A1c 13.8  - VAsc 20/70 OD // 20/40 OS; pinholes down to 20/20 -2 OU  - No signs of diabetic retinopathy on DFE today  - Blurred vision in both eyes likely 2/2 refractive error  - Recommend follow up in clinic for refraction and glasses  - Patient will be called by  for appt       Thank you for your consult. I will sign off. Please contact us if you have any additional questions.     Patient will be called to schedule appt.    Francisco Bansal MD  Ophthalmology  [facility]

## 2018-12-08 NOTE — SUBJECTIVE & OBJECTIVE
"Interval HPI:   Overnight events: Tolerating diet. Plan to D.C home today. Glucose >200   Eatin%  Nausea: No  Hypoglycemia and intervention: No  Fever: No  TPN and/or TF: No  If yes, type of TF/TPN and rate: n/a    /69 (BP Location: Right arm, Patient Position: Lying)   Pulse 65   Temp 98.7 °F (37.1 °C) (Oral)   Resp 16   Ht 5' 9" (1.753 m)   Wt 106.6 kg (235 lb)   SpO2 98%   BMI 34.70 kg/m²     Labs Reviewed and Include    Recent Labs   Lab 18  0421   *   CALCIUM 8.9   *   K 3.2*   CO2 22*      BUN 8   CREATININE 1.0     Lab Results   Component Value Date    WBC 4.22 2018    HGB 10.3 (L) 2018    HCT 30.4 (L) 2018    MCV 87 2018     (L) 2018     No results for input(s): TSH, FREET4 in the last 168 hours.  Lab Results   Component Value Date    HGBA1C 13.8 (H) 2018       Nutritional status:   Body mass index is 34.7 kg/m².  Lab Results   Component Value Date    ALBUMIN 3.4 (L) 2018    ALBUMIN 4.0 2018     No results found for: PREALBUMIN    Estimated Creatinine Clearance: 113.5 mL/min (based on SCr of 1 mg/dL).    Accu-Checks  Recent Labs     18  0419 18  0526 18  0651 18  0806 18  0905 18  1031 18  1141 18  1700 18  2038 18  0731   POCTGLUCOSE 137* 143* 140* 159* 178* 211* 246* 338* 250* 294*       Current Medications and/or Treatments Impacting Glycemic Control  Immunotherapy:    Immunosuppressants     None        Steroids:   Hormones (From admission, onward)    None        Pressors:    Autonomic Drugs (From admission, onward)    None        Hyperglycemia/Diabetes Medications:   Antihyperglycemics (From admission, onward)    Start     Stop Route Frequency Ordered    18 0900  insulin detemir U-100 pen 30 Units      -- SubQ Daily 18 0854    18 1130  insulin aspart U-100 pen 10 Units      -- SubQ 3 times daily with meals 18 08    " 12/07/18 1130  insulin aspart U-100 pen 0-5 Units      -- SubQ Before meals & nightly PRN 12/07/18 1031

## 2018-12-08 NOTE — ASSESSMENT & PLAN NOTE
· Patient reports 2 week history of progressively worsening blurry vision in left eye and 3 year history of basically total vision loss in right eye. Reports was seen by ophthalmologist when initial symptoms in right eye began, but doesn't recall explanation for symptoms.   · Likely secondary to diabetic retinopathy given presentation and A1c.   · Consulted ophthalmology; no diabetic retinopathy; diagnosed with refractive error; will follow-up with ophthalmology clinic here as outpatient.

## 2018-12-08 NOTE — ASSESSMENT & PLAN NOTE
- patient with uncontrolled DM2 given A1C at 13.8 and obesity  - patient is without insurance at this time, which limit treatment options  - long discussion with patient about diabetes disease, treatment options and long term complications with patient  - per primary, patient possibly wanting to be discharge home today as he is worry about cost of hospital stay    Plan  - glucose still elevated with current regiment, will increase Levemir 30U Daily, Novolog 10U TIDWM and low dose correction scale  - discuss with primary team, as this is only tentative regiment and may be underestimanting    Dispo Recs: I  - given uninsurance status with uncontrolled DM, will need insulin at home.   - Will be Novolin 70/30, which can be obtained at [facility] (I discussed with patient)  - With current regiment above, he will need 60U insulin daily, will need roughly Novolin 70/30 (or 25U in AM and 35U at night given he eats a large portion of his meals at night)  - CM to help set up appt  - Will provide vial and syringe training today  - Order sent to pharmacy for both glucometer, lancets, syringes (CM to help cover cost)

## 2018-12-10 ENCOUNTER — TELEPHONE (OUTPATIENT)
Dept: OPHTHALMOLOGY | Facility: CLINIC | Age: 43
End: 2018-12-10

## 2018-12-11 ENCOUNTER — PATIENT OUTREACH (OUTPATIENT)
Dept: ADMINISTRATIVE | Facility: CLINIC | Age: 43
End: 2018-12-11

## 2018-12-11 NOTE — PATIENT INSTRUCTIONS
Taking Medicine for Diabetes    Medicines cant cure diabetes. But they can delay or prevent health complications by helping you manage your blood sugar. Taking medicines every day, especially shots, may seem overwhelming. But they are powerful tools you can use to stay in control of your health.  Where the medicines work  Diabetes medicines act on different parts of the body. Many of them affect how the pancreas makes insulin. Others increase how sensitive muscle and fat cells are to insulin. Or they keep the liver from releasing too much glucose. And some cause carbohydrates to break down more slowly. The diagram on this sheet shows where each class of medicine works in the body.  Getting familiar with shots  Some medicine, including insulin, cant be swallowed. They are usually injected through the skin to reach the bloodstream. Its not hard to learn how to give yourself shots. You may find that they arent as bad as you fear. And there are new devices for injecting or inhaling insulin that may be available to you. Ask your healthcare provider for more information.  Sticking to your medicine routine  Taking your medicines at the right times will give you the best control over your blood sugar. Like a meal routine, a medicine routine can help keep your blood sugar steady. Keep track of medicines with a pill organizer and a daily schedule. Ask your family to help you stick to a medicine routine. And dont get distracted. Make it a priority.  If you take other medicines  Medicines of all types can affect blood sugar. This includes over-the-counter medicines and those prescribed for other health problems. Make sure you tell your healthcare provider about all the medicines you take, including herbs and vitamins. And always remember to tell the pharmacist that you have diabetes when buying other medicines.  Date Last Reviewed: 10/1/2016  © 7105-3222 [employer]. [address] [place] [address], [place]  [address]. All rights reserved. This information is not intended as a substitute for professional medical care. Always follow your healthcare professional's instructions.        Diabetes: Exams and Tests    For your diabetes care, you may see your primary care provider or a specialist 2 to 4 times a year. This page lists some of the regular exams and tests recommended for people with diabetes. To learn more, contact the American Diabetes Association (164-475-0433, www.diabetes.org).  Tests and immunizations  These should be done at least as often as stated below:  · Blood pressure check: every healthcare provider visit  · A1C: at first, every 3 months; if controlled, then every 3 to 6 months   · Cholesterol and blood lipid tests: at least every 12 months.  · Urine tests for kidney function: every 12 months  · Flu shots: once a year  · Pneumonia shots: talk with your healthcare provider about which pneumonia vaccines are right for you  · Hepatitis B shots: as soon as possible if youre under 60, or as advised by your healthcare provider if youre older than 60  · Shingles vaccine after age 60, even if you have already had shingles   · Other tests or vaccines: as advised by your healthcare provider  · Individualized medical nutrition therapy: at least once, then as needed  · Stop smoking counseling, if you still smoke, at each visit   Regular exams  The following exams help keep you healthy:  · Foot exams. Nerve and blood vessel problems can affect your feet sooner than other parts of your body. Make sure that your healthcare provider checks your feet at every office visit.  · Eye exams. You can have problems with your eyes even if you dont have trouble seeing. An ophthalmologist (eye healthcare provider) or specially trained optometrist will give you a dilated eye exam at least once a year. If you see dark spots, see poorly in dim light, have eye pain or pressure, or notice any other problems, tell your healthcare  provider right away.  · Dental exams. Gum disease (also called periodontal disease) and other mouth problems are common in people with diabetes. To help prevent these problems, see your dentist two or more times a year.  Ask your healthcare provider what other exams youll need on a regular basis.  Date Last Reviewed: 6/1/2016  © 4787-0662 "[employer]. [address] [place] [address], [place] [address]. All rights reserved. This information is not intended as a substitute for professional medical care. Always follow your healthcare professional's instructions.        Diabetic Ketoacidosis  Diabetic ketoacidosis (DKA) is a serious problem that can happen in people with diabetes. DKA should be treated as a medical emergency. This is because it can lead to coma or death. If you have the symptoms of DKA, get medical help right away.  DKA happens more often in people with type 1 diabetes. But it can happen in people with type 2 diabetes. It can also happen in women with diabetes during pregnancy. This is often known as gestational diabetes.  DKA happens when insulin levels are too low. Without enough insulin, sugar (glucose) cant get to the cells of your body. The glucose stays in the blood. This causes high blood glucose (hyperglycemia). Without glucose, your body breaks down stored fat for energy. When this happens, acids called ketones are released into the blood. This is known as ketosis. High levels of ketones (ketoacidosis) can be harmful to you. Hyperglycemia and ketoacidosis can also cause serious problems in the blood and your body, such as:  · Low levels of potassium (hypokalemia)  · Swelling inside the brain (cerebral edema)  · Fluid in the lungs (pulmonary edema)  · Damage to kidneys or other organs  What causes diabetic ketoacidosis?  In people with diabetes, DKA is most often caused by too little insulin in the body. It is also caused by:  · Poor management of diabetes  · Infections like a urinary  tract infection or pneumonia  · Serious health problems, such as a heart attack  · Reactions to certain prescribed medicines and illicit drugs  Symptoms of diabetic ketoacidosis  DKA most often happens slowly over time. But it can worsen in a few hours if you are vomiting. The first symptoms are:  · Thirst and dry mouth  · Urinating a lot  · Belly pain  · Nausea or vomiting  · Breath that smells fruity (from the ketones)  Over time, these symptoms may happen:  · Dry or flushed skin  · Nausea and vomiting  · Loss of appetite  · Weight loss  · Belly pain  · Trouble breathing  · Trouble thinking or confusion  · Feeling very tired or weak. This can lead to coma.  How is diabetic ketoacidosis diagnosed?  Your healthcare provider will ask about your medical history. He or she will give you a physical exam. You may also have these tests:  · Blood tests to check your glucose levels  · Blood tests to check your electrolytes, such as potassium and sodium  · Urine test to check for ketones  These tests are done to check for DKA, and monitor it over time.  How is diabetic ketoacidosis treated?  DKA needs treatment right away in the hospital. Treatment includes:  · Insulin. This is the main type of treatment. Insulin allows the cells to use the glucose in the blood. This lowers the levels of both blood glucose and ketones.  · Fluids and electrolytes. These are given through a vein (IV). Fluids are replaced and abnormal electrolyte levels are corrected.  · Other medicines. These may be given to treat an illness that caused DKA. For example, antibiotics may be given to treat a urinary tract infection that caused DKA.  Preventing diabetic ketoacidosis  To help prevent DKA, make sure you:  · Take all of your medicines for diabetes exactly as prescribed. This includes insulin.  · Check your blood glucose levels exactly as instructed.  · Be especially careful when you are sick with an illness or an infection. Take extra care to follow  diabetes care instructions. Check your blood glucose more often.  · Do not exercise when your blood sugar is high and you have ketones in your urine.   · Check your urine ketone levels if told to do so. This is done with a urine test strip. Ask your healthcare provider how often to check your urine.     When to call your healthcare provider  Call your healthcare provider right away if you:  · Have symptoms of DKA  · Have very high blood glucose levels  · Are getting sick with another illness   Date Last Reviewed: 7/1/2016 © 2000-2017 [employer]. [address] [place] [address]. All rights reserved. This information is not intended as a substitute for professional medical care. Always follow your healthcare professional's instructions.        Types of Insulin  Insulin is a type of hormone. It helps the body use blood sugar (glucose) for fuel. With diabetes, your body may not make enough insulin. You may need insulin injections to help manage this condition. In addition, your cells may have trouble using insulin if you have type 2 diabetes. There are many types of insulin that can be prescribed for your treatment. Your healthcare provider will work with you to find the types that are best for you. Most insulin is made in a lab. Its called human insulin because its just like the insulin thats made in the body. Some types of insulin work fast. Other types work slowly and last longer.  According to the American Diabetes Association, the different types of insulin work in the following ways:  1. Rapid-acting insulin:  · Begins working about 10 to 20 minutes after taken.  · Peaks about 30 to 90 minutes after taken.  · Continues working for 3 to 5 hours.  Name of insulin:                                                                            2. Short-acting or regular insulin:  · Begins working about 30 to 60 minutes after taken.  · Peaks within 2 to 4 hours after taken.  · Continues working  for 5 to 8 hours.  Name of insulin:                                                                         3. Intermediate-acting insulin:  · Begins working about 1 to 3 hours after taken.  · Peaks within 8 hours after taken.  · Continues working for 12 to 16 hours.  Name of insulin:                                                                         4. Long-acting insulin:  · Begins working about 1 hour after taken.  · No peak.  · Continuous even action for about 20 to 26 hours  Name of insulin:                                                                         5. Premixed combinations of intermediate-acting and short-acting insulin:  · Begins working about 30 to 60 minutes after taken.  · Strongest action is variable.  · Continues working for 10 to 16 hours.  Name of insulin:                                                                          6. Premixed combinations of intermediate-acting and rapid-acting insulin:  · Begins working about 5 to 15 minutes after taken.  · Strongest action is variable.  · Continues working for 10 to 16 hours.  Name of insulin: _____________________________  7. Inhaled insulin:  · Begins working about 12 to 15 minutes after taken.  · Strongest action is about 30 minutes after being inhaled.  · Continues working for 180 minutes.  Name of insulin: _____________________________  Date Last Reviewed: 6/1/2016  © 8388-6248 [employer]. [address] [place] [address], [place] [address]. All rights reserved. This information is not intended as a substitute for professional medical care. Always follow your healthcare professional's instructions.        Hypoglycemia (Low Blood Sugar)     Fast-acting sugar includes a cup of nonfat milk.     Too little sugar (glucose) in your blood is called hypoglycemia or low blood sugar. Low blood sugar usually means anything lower than 70 mg/dL. Talk with your healthcare provider about your target range and what level is too low for you.  Diabetes itself doesnt cause low blood sugar. But some of the treatments for diabetes, such as pills or insulin, may raise your risk for it. Low blood sugar may cause you to pass out or have a seizure. So always treat low blood sugar right away, but don't overeat.  Special note: Always carry a source of fast-acting sugar and a snack in case of hypoglycemia.   What you may notice  If you have low blood sugar, you may have one or more of these symptoms:  · Shakiness or dizziness  · Cold, clammy skin or sweating  · Feelings of hunger  · Headache  · Nervousness  · A hard, fast heartbeat  · Weakness  · Confusion or irritability  · Blurred vision  · Having nightmares or waking up confused or sweating  · Numbness or tingling in the lips or tongue  What you should do  Here are tips to follow if you have hypoglycemia:   · First check your blood sugar. If it is too low (out of your target range), eat or drink 15 to 20 grams of fast-acting sugar. This may be 3 to 4 glucose tablets, 4 ounces (half a cup) of fruit juice or regular (nondiet) soda, 8 ounces (1 cup) of fat-free milk, or 1 tablespoon of honey. Dont take more than this, or your blood sugar may go too high.  · Wait 15 minutes. Then recheck your blood sugar if you can.  · If your blood sugar is still too low, repeat the steps above and check your blood sugar again. If your blood sugar still has not returned to your target range, contact your healthcare provider or seek emergency care.  · Once your blood sugar returns to target range, eat a snack or meal.  Preventing low blood sugar  Things you can do include the following:   · If your condition needs a strict treatment plan, eat your meals and snacks at the same times each day. Dont skip meals!  · If your treatment plan lets you change when you eat and what you eat, learn how to change the time and dose of your rapid-acting insulin to match this.   · Ask your healthcare provider if it is safe for you to drink  alcohol. Never drink on an empty stomach.  · Take your medicine at the prescribed times.  · Always carry a source of fast-acting sugar and a snack when youre away from home.  Other things to do  Additional tips include the following:  · Carry a medical ID card, a compact USB drive, or wear a medical alert bracelet or necklace. It should say that you have diabetes. It should also say what to do if you pass out or have a seizure.  · Make sure your family, friends, and coworkers know the signs of low blood sugar. Tell them what to do if your blood sugar falls very low and you cant treat yourself.  · Keep a glucagon emergency kit handy. Be sure your family, friends, and coworkers know how and when to use it. Check it regularly and replace the glucagon before it expires.  · Talk with your health care team about other things you can do to prevent low blood sugar.     If you have unexplained hypoglycemia or hypoglycemia several times, call your healthcare provider.   Date Last Reviewed: 5/1/2016 © 2000-2017 [employer]. [address] [place] [address]. All rights reserved. This information is not intended as a substitute for professional medical care. Always follow your healthcare professional's instructions.        Using a Blood Sugar Log    You have diabetes. This means your body has trouble regulating a sugar called glucose. To help manage your diabetes, youll need to check your blood sugar level as directed by your healthcare provider. Keeping a log of your blood sugar levels will help you track your blood sugar readings. Its a simple and easy way to see how well you are controlling your diabetes.  Checking your blood sugar level  You can check your blood sugar level with a blood glucose meter. Youll first prick the side of your finger with a tiny lancet to draw a tiny drop of blood onto the test strip. Some glucose meters let you use another place on your body to test. But these other  places should not be used in some cases as they may be inaccurate. Follow the instructions for your glucose meter. And talk with your healthcare provider before doing the test on other places.  The strip goes into the meter first, then a drop of blood is placed on the tip of the strip. The meter then shows a reading that tells you the level of your blood sugar. Your readings should be in your target range as often as possible. This means not too high or too low. Staying in this range helps lower your risk for complications. Your healthcare provider will help you figure out the target range that is best for you.  Tracking your readings  Every time you check your blood sugar, use your log to keep track of your readings. Your meter will also probably have a memory feature that your healthcare provider can check at your next visit. You may be advised by your healthcare provider to check your blood sugar in the morning, at bedtime, and before and after meals. Be sure to write down all of your numbers. Also use your log to record things that might have affected your blood sugar. Some examples include being sick, certain medicines, being physically active, feeling stressed, or skipping meals.   Lessons learned from your readings  Tracking your blood sugar readings helps you see patterns. These patterns tell you how your actions affect your blood sugar. For instance, you may have higher numbers after eating certain foods or lower numbers after exercise. They just help you understand how to stay in your target range more often, so that your diabetes remains in good control.  Sharing your log with your healthcare team  Bring your blood sugar log and glucose meter with you to all of your healthcare appointments. This can help your healthcare team make changes to your treatment plan, if needed. This may involve making changes in what you eat, what medicines you take, or how much you exercise.  To learn more  The resources below  can help you learn more:  · American Diabetes Association 457-334-2795 www.diabetes.org  · [employer] 212-418-5223 www.lighthouse.org  · [employer] [facility] [place] 941-932-0892 www.nei.nih.gov  · [employer] 079-068-8315 www.hormone.org  Date Last Reviewed: 5/1/2016  © 5780-9381 [employer]. [address] [place] [address], [place] [id number]. All rights reserved. This information is not intended as a substitute for professional medical care. Always follow your healthcare professional's instructions.

## 2018-12-12 ENCOUNTER — TELEPHONE (OUTPATIENT)
Dept: OPHTHALMOLOGY | Facility: CLINIC | Age: 43
End: 2018-12-12

## 2018-12-19 ENCOUNTER — OFFICE VISIT (OUTPATIENT)
Dept: FAMILY MEDICINE | Facility: HOSPITAL | Age: 43
End: 2018-12-19
Attending: FAMILY MEDICINE

## 2018-12-19 VITALS
HEART RATE: 73 BPM | WEIGHT: 244.94 LBS | BODY MASS INDEX: 36.28 KG/M2 | SYSTOLIC BLOOD PRESSURE: 140 MMHG | DIASTOLIC BLOOD PRESSURE: 89 MMHG | HEIGHT: 69 IN

## 2018-12-19 DIAGNOSIS — Z79.4 TYPE 2 DIABETES MELLITUS WITH HYPERGLYCEMIA, WITH LONG-TERM CURRENT USE OF INSULIN: Primary | ICD-10-CM

## 2018-12-19 DIAGNOSIS — E11.65 TYPE 2 DIABETES MELLITUS WITH HYPERGLYCEMIA, WITH LONG-TERM CURRENT USE OF INSULIN: Primary | ICD-10-CM

## 2018-12-19 DIAGNOSIS — R03.0 ELEVATED BLOOD PRESSURE READING IN OFFICE WITHOUT DIAGNOSIS OF HYPERTENSION: ICD-10-CM

## 2018-12-19 DIAGNOSIS — F10.10 ALCOHOL ABUSE: ICD-10-CM

## 2018-12-19 PROCEDURE — 99214 OFFICE O/P EST MOD 30 MIN: CPT | Performed by: PHYSICIAN ASSISTANT
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
## 2018-12-19 NOTE — PATIENT INSTRUCTIONS
Understanding Carbohydrates, Fats, and Protein  Food is a source of fuel and nourishment for your body. Its also a source of pleasure. Having diabetes doesnt mean you have to eat special foods or give up desserts. Instead, your dietitian can show you how to plan meals to suit your body. To start, learn how different foods affect blood sugar.  Carbohydrates  Carbohydrates are the main source of fuel for the body. Carbohydrates raise blood sugar. Many people think carbohydrates are only found in pasta or bread. But carbohydrates are actually in many kinds of foods:  · Sugars occur naturally in foods such as fruit, milk, honey, and molasses. Sugars can also be added to many foods, from cereals and yogurt to candy and desserts. Sugars raise blood sugar.  · Starches are found in bread, cereals, pasta, and dried beans. Theyre also found in corn, peas, potatoes, yam, acorn squash, and butternut squash. Starches also raise blood sugar.   · Fiber is found in foods such as vegetables, fruits, beans, and whole grains. Unlike other carbs, fiber isnt digested or absorbed. So it doesnt raise blood sugar. In fact, fiber can help keep blood sugar from rising too fast. It also helps keep blood cholesterol at a healthy level.  Did you know?  Even though carbohydrates raise blood sugar, its best to have some in every meal. They are an important part of a healthy diet.   Fat  Fat is an energy source that can be stored until needed. Fat does not raise blood sugar. However, it can raise blood cholesterol, increasing the risk of heart disease. Fat is also high in calories, which can cause weight gain. Not all types of fat are the same.  More Healthy:  · Monounsaturated fats are mostly found in vegetable oils, such as olive, canola, and peanut oils. They are also found in avocados and some nuts. Monounsaturated fats are healthy for your heart. Thats because they lower LDL (unhealthy) cholesterol.  · Polyunsaturated fats are mostly  found in vegetable oils, such as corn, safflower, and soybean oils. They are also found in some seeds, nuts, and fish. Polyunsaturated fats lower LDL (unhealthy) cholesterol. So, choosing them instead of saturated fats is healthy for your heart. Certain unsaturated fats can help lower triglycerides.   Less Healthy:  · Saturated fats are found in animal products, such as meat, poultry, whole milk, lard, and butter. Saturated fats raise LDL cholesterol and are not healthy for your heart.  · Hydrogenated oils and trans fats are formed when vegetable oils are processed into solid fats. They are found in many processed foods. Hydrogenated oils and trans fats raise LDL cholesterol and lower HDL (healthy) cholesterol. They are not healthy for your heart.  Protein  Protein helps the body build and repair muscle and other tissue. Protein has little or no effect on blood sugar. However, many foods that contain protein also contain saturated fat. By choosing low-fat protein sources, you can get the benefits of protein without the extra fat:  · Plant protein is found in dry beans and peas, nuts, and soy products, such as tofu and soymilk. These sources tend to be cholesterol-free and low in saturated fat.  · Animal protein is found in fish, poultry, meat, cheese, milk, and eggs. These contain cholesterol and can be high in saturated fat. Aim for lean, lower-fat choices.  Date Last Reviewed: 3/1/2016  © 7432-3476 [employer]. [address] [place] [id number]. All rights reserved. This information is not intended as a substitute for professional medical care. Always follow your healthcare professional's instructions.        Diabetes: Meal Planning    You can help keep your blood sugar level in your target range by eating healthy foods. Your healthcare team can help you create a low-fat, nutritious meal plan. Take an active role in your diabetes management by following your meal plan and working with your  healthcare team.  Make your meal plan  A meal plan gives guidelines for the types and amounts of food you should eat. The goal is to balance food and insulin (or other diabetes medications) so your blood sugars will be in your target range. Your dietitian will help you make a flexible meal plan that includes many foods that you like.  Watch serving sizes  Your meal plan will group foods by servings. To learn how much a serving is, start by measuring food portions at each meal. Soon youll know what a serving looks like on your plate. Ask your healthcare provider about how to balance servings of different foods.  Eat from all the food groups  The basis of a healthy meal plan is variety (eating lots of different foods). Choose lean meats, fresh fruits and vegetables, whole grains, and low-fat or nonfat dairy products. Eating a wide variety of foods provides the nutrients your body needs. It can also keep you from getting bored with your meal plan.  Learn about carbohydrates, fats, and protein  · Carbohydrates are starches, sugars, and fiber. They are found in many foods, including fruit, bread, pasta, milk, and sweets. Of all the foods you eat, carbohydrates have the most effect on your blood sugar. Your dietitian may teach you about carb counting, a way to figure out the number of carbohydrates in a meal.  · Fats have the most calories. They also have the most effect on your weight and your risk of heart disease. When you have diabetes, its important to control your weight and protect your heart. Foods that are high in fat include whole milk, cheese, snack foods, and desserts.  · Protein is important for building and repairing muscles and bones. Choose low-fat protein sources, such as fish, egg whites, and skinless chicken.  Reduce liquid sugars  Extra calories from sodas, sports drinks, and fruit drinks make it hard to keep blood sugar in range. Cut as many liquid sugars from your meal plan as you can.  This  includes most fruit juices, which are often high in natural or added sugar. Instead, drink plenty of water and other sugar-free beverages.  Eat less fat  If you need to lose weight, try to reduce the amount of fat in your diet. This can also help lower your cholesterol level to keep blood vessels healthier. Cut fat by using only small amounts of liquid oil for cooking. Read food labels carefully to avoid foods with unhealthy trans fats.  Timing your meals  When it comes to blood sugar control, when you eat is as important as what you eat. You may need to eat several small meals spaced evenly throughout the day to stay in your target range. So dont skip breakfast or wait until late in the day to get most of your calories. Doing so can cause your blood sugar to rise too high or fall too low.   Date Last Reviewed: 3/1/2016  © 9101-4233 [employer]. [address] [place] [address], [place] [address]. All rights reserved. This information is not intended as a substitute for professional medical care. Always follow your healthcare professional's instructions.